# Patient Record
Sex: FEMALE | Race: BLACK OR AFRICAN AMERICAN | NOT HISPANIC OR LATINO | URBAN - NONMETROPOLITAN AREA
[De-identification: names, ages, dates, MRNs, and addresses within clinical notes are randomized per-mention and may not be internally consistent; named-entity substitution may affect disease eponyms.]

---

## 2023-03-02 LAB
ALANINE AMINOTRANSFERASE (SGPT) (U/L) IN SER/PLAS: 11 U/L (ref 7–45)
ALBUMIN (G/DL) IN SER/PLAS: 3.8 G/DL (ref 3.4–5)
ALKALINE PHOSPHATASE (U/L) IN SER/PLAS: 80 U/L (ref 33–110)
ANION GAP IN SER/PLAS: 12 MMOL/L (ref 10–20)
ASPARTATE AMINOTRANSFERASE (SGOT) (U/L) IN SER/PLAS: 15 U/L (ref 9–39)
BASOPHILS (10*3/UL) IN BLOOD BY AUTOMATED COUNT: 0.04 X10E9/L (ref 0–0.1)
BASOPHILS/100 LEUKOCYTES IN BLOOD BY AUTOMATED COUNT: 0.3 % (ref 0–2)
BILIRUBIN TOTAL (MG/DL) IN SER/PLAS: 0.3 MG/DL (ref 0–1.2)
CALCIUM (MG/DL) IN SER/PLAS: 8.9 MG/DL (ref 8.6–10.3)
CARBON DIOXIDE, TOTAL (MMOL/L) IN SER/PLAS: 22 MMOL/L (ref 21–32)
CHLORIDE (MMOL/L) IN SER/PLAS: 103 MMOL/L (ref 98–107)
CHOLESTEROL (MG/DL) IN SER/PLAS: 138 MG/DL (ref 0–199)
CHOLESTEROL IN HDL (MG/DL) IN SER/PLAS: 38 MG/DL
CHOLESTEROL/HDL RATIO: 3.6
CREATININE (MG/DL) IN SER/PLAS: 0.69 MG/DL (ref 0.5–1.05)
EOSINOPHILS (10*3/UL) IN BLOOD BY AUTOMATED COUNT: 0.55 X10E9/L (ref 0–0.7)
EOSINOPHILS/100 LEUKOCYTES IN BLOOD BY AUTOMATED COUNT: 4.4 % (ref 0–6)
ERYTHROCYTE DISTRIBUTION WIDTH (RATIO) BY AUTOMATED COUNT: 18.2 % (ref 11.5–14.5)
ERYTHROCYTE MEAN CORPUSCULAR HEMOGLOBIN CONCENTRATION (G/DL) BY AUTOMATED: 29.2 G/DL (ref 32–36)
ERYTHROCYTE MEAN CORPUSCULAR VOLUME (FL) BY AUTOMATED COUNT: 67 FL (ref 80–100)
ERYTHROCYTES (10*6/UL) IN BLOOD BY AUTOMATED COUNT: 5.39 X10E12/L (ref 4–5.2)
FERRITIN (UG/LL) IN SER/PLAS: 123 UG/L (ref 8–150)
FOLATE (NG/ML) IN SER/PLAS: 6.7 NG/ML
GFR FEMALE: >90 ML/MIN/1.73M2
GLUCOSE (MG/DL) IN SER/PLAS: 87 MG/DL (ref 74–99)
HEMATOCRIT (%) IN BLOOD BY AUTOMATED COUNT: 36 % (ref 36–46)
HEMOGLOBIN (G/DL) IN BLOOD: 10.5 G/DL (ref 12–16)
IMMATURE GRANULOCYTES/100 LEUKOCYTES IN BLOOD BY AUTOMATED COUNT: 0.6 % (ref 0–0.9)
IRON (UG/DL) IN SER/PLAS: 13 UG/DL (ref 35–150)
IRON BINDING CAPACITY (UG/DL) IN SER/PLAS: 288 UG/DL (ref 240–445)
IRON SATURATION (%) IN SER/PLAS: 5 % (ref 25–45)
LDL: 81 MG/DL (ref 0–99)
LEUKOCYTES (10*3/UL) IN BLOOD BY AUTOMATED COUNT: 12.4 X10E9/L (ref 4.4–11.3)
LYMPHOCYTES (10*3/UL) IN BLOOD BY AUTOMATED COUNT: 2.76 X10E9/L (ref 1.2–4.8)
LYMPHOCYTES/100 LEUKOCYTES IN BLOOD BY AUTOMATED COUNT: 22.2 % (ref 13–44)
MONOCYTES (10*3/UL) IN BLOOD BY AUTOMATED COUNT: 0.86 X10E9/L (ref 0.1–1)
MONOCYTES/100 LEUKOCYTES IN BLOOD BY AUTOMATED COUNT: 6.9 % (ref 2–10)
NEUTROPHILS (10*3/UL) IN BLOOD BY AUTOMATED COUNT: 8.15 X10E9/L (ref 1.2–7.7)
NEUTROPHILS/100 LEUKOCYTES IN BLOOD BY AUTOMATED COUNT: 65.6 % (ref 40–80)
PLATELETS (10*3/UL) IN BLOOD AUTOMATED COUNT: 478 X10E9/L (ref 150–450)
POTASSIUM (MMOL/L) IN SER/PLAS: 3.3 MMOL/L (ref 3.5–5.3)
PROTEIN TOTAL: 7.9 G/DL (ref 6.4–8.2)
SODIUM (MMOL/L) IN SER/PLAS: 134 MMOL/L (ref 136–145)
TRIGLYCERIDE (MG/DL) IN SER/PLAS: 95 MG/DL (ref 0–149)
UREA NITROGEN (MG/DL) IN SER/PLAS: 9 MG/DL (ref 6–23)
VLDL: 19 MG/DL (ref 0–40)

## 2023-03-03 LAB
ESTIMATED AVERAGE GLUCOSE FOR HBA1C: 146 MG/DL
HEMOGLOBIN A1C/HEMOGLOBIN TOTAL IN BLOOD: 6.7 %

## 2023-03-06 ENCOUNTER — TELEPHONE (OUTPATIENT)
Dept: PRIMARY CARE | Facility: CLINIC | Age: 53
End: 2023-03-06
Payer: MEDICARE

## 2023-03-14 RX ORDER — LOSARTAN POTASSIUM 50 MG/1
50 TABLET ORAL DAILY
COMMUNITY
End: 2023-07-27 | Stop reason: SDUPTHER

## 2023-03-14 RX ORDER — INSULIN DETEMIR 100 [IU]/ML
110 INJECTION, SOLUTION SUBCUTANEOUS NIGHTLY
COMMUNITY
End: 2023-07-27 | Stop reason: ALTCHOICE

## 2023-04-27 ENCOUNTER — TELEPHONE (OUTPATIENT)
Dept: PRIMARY CARE | Facility: CLINIC | Age: 53
End: 2023-04-27
Payer: MEDICARE

## 2023-04-27 DIAGNOSIS — E11.9 TYPE 2 DIABETES MELLITUS WITHOUT COMPLICATION, WITH LONG-TERM CURRENT USE OF INSULIN (MULTI): Primary | ICD-10-CM

## 2023-04-27 DIAGNOSIS — Z79.4 TYPE 2 DIABETES MELLITUS WITHOUT COMPLICATION, WITH LONG-TERM CURRENT USE OF INSULIN (MULTI): Primary | ICD-10-CM

## 2023-04-27 RX ORDER — TOPIRAMATE 50 MG/1
50 TABLET, FILM COATED ORAL DAILY
COMMUNITY
End: 2023-09-08 | Stop reason: ALTCHOICE

## 2023-04-27 RX ORDER — BLOOD-GLUCOSE,RECEIVER,CONT
EACH MISCELLANEOUS
Qty: 1 EACH | Refills: 0 | Status: SHIPPED | OUTPATIENT
Start: 2023-04-27 | End: 2023-07-27 | Stop reason: ALTCHOICE

## 2023-04-27 RX ORDER — ALPRAZOLAM 1 MG/1
1 TABLET ORAL NIGHTLY PRN
COMMUNITY
Start: 2022-01-21

## 2023-04-27 RX ORDER — BLOOD-GLUCOSE SENSOR
1 EACH MISCELLANEOUS DAILY
Qty: 1 EACH | Refills: 0 | Status: SHIPPED | OUTPATIENT
Start: 2023-04-27 | End: 2023-05-04 | Stop reason: SDUPTHER

## 2023-04-27 RX ORDER — FLUOXETINE HYDROCHLORIDE 40 MG/1
40 CAPSULE ORAL DAILY
COMMUNITY

## 2023-04-27 RX ORDER — FLASH GLUCOSE SENSOR
KIT MISCELLANEOUS
COMMUNITY
Start: 2023-04-17 | End: 2023-05-04 | Stop reason: SDUPTHER

## 2023-04-27 RX ORDER — LURASIDONE HYDROCHLORIDE 80 MG/1
1 TABLET, FILM COATED ORAL DAILY
COMMUNITY
End: 2024-03-25 | Stop reason: DRUGHIGH

## 2023-04-27 RX ORDER — FLASH GLUCOSE SCANNING READER
EACH MISCELLANEOUS
COMMUNITY
Start: 2023-02-12 | End: 2023-05-04 | Stop reason: SDUPTHER

## 2023-04-27 RX ORDER — ATORVASTATIN CALCIUM 20 MG/1
20 TABLET, FILM COATED ORAL DAILY
COMMUNITY
End: 2023-07-27 | Stop reason: SDUPTHER

## 2023-05-03 DIAGNOSIS — E11.9 TYPE 2 DIABETES MELLITUS WITHOUT COMPLICATION, WITH LONG-TERM CURRENT USE OF INSULIN (MULTI): ICD-10-CM

## 2023-05-03 DIAGNOSIS — Z79.4 TYPE 2 DIABETES MELLITUS WITHOUT COMPLICATION, WITH LONG-TERM CURRENT USE OF INSULIN (MULTI): ICD-10-CM

## 2023-05-03 RX ORDER — INSULIN DETEMIR 100 [IU]/ML
110 INJECTION, SOLUTION SUBCUTANEOUS NIGHTLY
COMMUNITY
Start: 2023-05-01 | End: 2023-05-03 | Stop reason: SDUPTHER

## 2023-05-03 RX ORDER — INSULIN DETEMIR 100 [IU]/ML
110 INJECTION, SOLUTION SUBCUTANEOUS NIGHTLY
Qty: 90 ML | Refills: 0 | Status: SHIPPED | OUTPATIENT
Start: 2023-05-03 | End: 2024-01-29 | Stop reason: SDUPTHER

## 2023-05-04 DIAGNOSIS — E11.9 TYPE 2 DIABETES MELLITUS WITHOUT COMPLICATION, WITH LONG-TERM CURRENT USE OF INSULIN (MULTI): ICD-10-CM

## 2023-05-04 DIAGNOSIS — E10.9 TYPE 1 DIABETES MELLITUS WITHOUT COMPLICATION (MULTI): ICD-10-CM

## 2023-05-04 DIAGNOSIS — Z79.4 TYPE 2 DIABETES MELLITUS WITHOUT COMPLICATION, WITH LONG-TERM CURRENT USE OF INSULIN (MULTI): ICD-10-CM

## 2023-05-04 RX ORDER — FLASH GLUCOSE SENSOR
KIT MISCELLANEOUS
Qty: 2 EACH | Refills: 0 | Status: SHIPPED | OUTPATIENT
Start: 2023-05-04 | End: 2023-05-25

## 2023-05-04 RX ORDER — BLOOD-GLUCOSE SENSOR
1 EACH MISCELLANEOUS DAILY
Qty: 10 EACH | Refills: 0 | Status: SHIPPED | OUTPATIENT
Start: 2023-05-04 | End: 2023-07-27 | Stop reason: ALTCHOICE

## 2023-05-04 RX ORDER — FLASH GLUCOSE SCANNING READER
EACH MISCELLANEOUS
Qty: 1 EACH | Refills: 0 | Status: SHIPPED | OUTPATIENT
Start: 2023-05-04 | End: 2023-07-05 | Stop reason: SDUPTHER

## 2023-05-23 DIAGNOSIS — E11.9 TYPE 2 DIABETES MELLITUS WITHOUT COMPLICATION, WITH LONG-TERM CURRENT USE OF INSULIN (MULTI): ICD-10-CM

## 2023-05-23 DIAGNOSIS — Z79.4 TYPE 2 DIABETES MELLITUS WITHOUT COMPLICATION, WITH LONG-TERM CURRENT USE OF INSULIN (MULTI): ICD-10-CM

## 2023-05-23 RX ORDER — PEN NEEDLE, DIABETIC 30 GX3/16"
200 NEEDLE, DISPOSABLE MISCELLANEOUS 2 TIMES DAILY
Qty: 200 EACH | Refills: 0 | Status: SHIPPED | OUTPATIENT
Start: 2023-05-23 | End: 2023-09-08 | Stop reason: SDUPTHER

## 2023-05-23 RX ORDER — PEN NEEDLE, DIABETIC 30 GX3/16"
200 NEEDLE, DISPOSABLE MISCELLANEOUS 2 TIMES DAILY
COMMUNITY
End: 2023-05-23 | Stop reason: SDUPTHER

## 2023-05-24 DIAGNOSIS — E10.9 TYPE 1 DIABETES MELLITUS WITHOUT COMPLICATION (MULTI): ICD-10-CM

## 2023-05-25 DIAGNOSIS — E10.9 TYPE 1 DIABETES MELLITUS WITHOUT COMPLICATION (MULTI): ICD-10-CM

## 2023-05-25 RX ORDER — FLASH GLUCOSE SENSOR
KIT MISCELLANEOUS
Qty: 2 EACH | Refills: 0 | Status: SHIPPED | OUTPATIENT
Start: 2023-05-25 | End: 2023-07-05 | Stop reason: SDUPTHER

## 2023-05-25 RX ORDER — FLASH GLUCOSE SENSOR
KIT MISCELLANEOUS
Qty: 2 EACH | Refills: 0 | Status: SHIPPED | OUTPATIENT
Start: 2023-05-25 | End: 2023-05-25 | Stop reason: SDUPTHER

## 2023-06-28 DIAGNOSIS — R11.0 NAUSEA: ICD-10-CM

## 2023-06-28 RX ORDER — ONDANSETRON 4 MG/1
4 TABLET, FILM COATED ORAL
Qty: 20 TABLET | Refills: 0 | Status: SHIPPED | OUTPATIENT
Start: 2023-06-28 | End: 2023-07-27 | Stop reason: SDUPTHER

## 2023-06-28 RX ORDER — ONDANSETRON 4 MG/1
1 TABLET, FILM COATED ORAL
COMMUNITY
Start: 2023-01-17 | End: 2023-06-28 | Stop reason: SDUPTHER

## 2023-07-05 DIAGNOSIS — E10.9 TYPE 1 DIABETES MELLITUS WITHOUT COMPLICATION (MULTI): ICD-10-CM

## 2023-07-05 RX ORDER — FLASH GLUCOSE SENSOR
KIT MISCELLANEOUS
Qty: 2 EACH | Refills: 0 | Status: SHIPPED | OUTPATIENT
Start: 2023-07-05 | End: 2023-10-06

## 2023-07-05 RX ORDER — FLASH GLUCOSE SCANNING READER
EACH MISCELLANEOUS
Qty: 1 EACH | Refills: 0 | Status: SHIPPED | OUTPATIENT
Start: 2023-07-05

## 2023-07-24 NOTE — PROGRESS NOTES
Subjective   Patient ID:   Km Tam is a 52 y.o. female who presents for Fatigue (Iron - anemia) and Diabetes.  HPI  Fatigue:  Symptoms x months.  Sleeping a lot.  No recent fatigue labs.  She does smoke marijuana, but states she has cut down.    Chronic back pain:  Was referred to pain management by Dr. Lopez in Aug 2022.  Uses medical marijuana.    Stress incontinence/Hidradenitis suppurativa:  Seeing urology/GYN.  Was put on Doxycycline and Clindamycin cream.    Anemia/Iron deficiency:  CBC in Feb 2022 showed a mild anemia.  Iron was found to be low in June 2022.  We started an iron supplement - has been hit and miss with this.  This has improved.    HTN:  Taking Losartan.  BP today is 137/88.  Denies dizziness, headaches.    HLD:  Taking Atorvastatin.  Last checked March 2023.    Diabetes:  Taking Levemir 55 units AM and 55 units PM.  Had previously been on 110 units in PM.  Last A1C was 6.7 in March 2023.  AM fasting sugars averaging low 100s.  Sugars seem to fluctuate throughout the day.  DUE for labs.    Schizophrenia/PTSD:  Taking Xanax, Prozac, Latuda.  Seeing psychiatry.  Denies SI/HI.    Health maintenance:  Smoking: Marijuana daily  Mammogram (40-75): March 2023.  Labs: March 2023. DUE for A1C, fatigue labs.  Colonoscopy (50-75): DUE - referred last visit.  Influenza:    Review of Systems  12 point review of systems negative unless stated above in HPI    Vitals:    07/27/23 0843   BP: 137/88   Pulse: 89   Resp: 18   SpO2: 95%     Physical Exam  General: Alert and oriented, well nourished, no acute distress.  Lungs: Clear to auscultation, non-labored respiration.  Heart: Normal rate, regular rhythm, no murmur, gallop or edema.  Neurologic: Awake, alert, and oriented X3, CN II-XII intact.  Psychiatric: Cooperative, appropriate mood and affect.    Assessment/Plan   I have ordered some labs to be done as soon as you can.  We will call you with the results.  These will check for causes of  fatigue.  Consider a sleep study.  I have placed a referral to a nutritionist.  If symptoms persist or worsen despite current plan of care, please contact your healthcare provider for further evaluation.  Patient instructed to contact the office if there are any questions regarding their care or treatment.   [facility] [phone].  [facility] [phone].    Fu 3-4 months  Diagnoses and all orders for this visit:  Fatigue, unspecified type  -     CBC and Auto Differential; Future  -     Comprehensive Metabolic Panel; Future  -     Vitamin D, Total; Future  -     Vitamin B12; Future  -     TSH with reflex to Free T4 if abnormal; Future  -     Rheumatoid Factor; Future  -     Magnesium; Future  -     Iron and TIBC; Future  -     Sedimentation Rate; Future  -     Folate; Future  -     Ferritin; Future  -     Hemoglobin A1C; Future  -     C-Reactive Protein; Future  -     Citrulline Antibody, IgG; Future  -     BANDAR with Reflex to EMILY; Future  Nausea  -     ondansetron (Zofran) 4 mg tablet; Take 1 tablet (4 mg) by mouth every 6 hours during the day.  Benign hypertension  -     losartan (Cozaar) 50 mg tablet; Take 1 tablet (50 mg) by mouth once daily.  Nausea and vomiting, unspecified vomiting type  Hypercholesterolemia  -     atorvastatin (Lipitor) 20 mg tablet; Take 1 tablet (20 mg) by mouth once daily.  Schizophrenia, unspecified type (CMS/[facility])  Obesity, morbid (CMS/[facility])  Controlled type 2 diabetes mellitus without complication, without long-term current use of insulin (CMS/[facility])  -     Hemoglobin A1C; Future  -     Referral to Nutrition Services; Future  Iron deficiency anemia, unspecified iron deficiency anemia type  -     Iron and TIBC; Future  -     Ferritin; Future

## 2023-07-27 ENCOUNTER — LAB (OUTPATIENT)
Dept: LAB | Facility: LAB | Age: 53
End: 2023-07-27
Payer: MEDICARE

## 2023-07-27 ENCOUNTER — OFFICE VISIT (OUTPATIENT)
Dept: PRIMARY CARE | Facility: CLINIC | Age: 53
End: 2023-07-27
Payer: MEDICARE

## 2023-07-27 VITALS
WEIGHT: 234.8 LBS | OXYGEN SATURATION: 95 % | DIASTOLIC BLOOD PRESSURE: 88 MMHG | SYSTOLIC BLOOD PRESSURE: 137 MMHG | HEIGHT: 60 IN | HEART RATE: 89 BPM | RESPIRATION RATE: 18 BRPM | BODY MASS INDEX: 46.1 KG/M2

## 2023-07-27 DIAGNOSIS — R11.2 NAUSEA AND VOMITING, UNSPECIFIED VOMITING TYPE: ICD-10-CM

## 2023-07-27 DIAGNOSIS — D50.9 IRON DEFICIENCY ANEMIA, UNSPECIFIED IRON DEFICIENCY ANEMIA TYPE: ICD-10-CM

## 2023-07-27 DIAGNOSIS — E78.00 HYPERCHOLESTEROLEMIA: ICD-10-CM

## 2023-07-27 DIAGNOSIS — F20.9 SCHIZOPHRENIA, UNSPECIFIED TYPE (MULTI): ICD-10-CM

## 2023-07-27 DIAGNOSIS — R11.0 NAUSEA: ICD-10-CM

## 2023-07-27 DIAGNOSIS — E11.9 CONTROLLED TYPE 2 DIABETES MELLITUS WITHOUT COMPLICATION, WITHOUT LONG-TERM CURRENT USE OF INSULIN (MULTI): ICD-10-CM

## 2023-07-27 DIAGNOSIS — I10 BENIGN HYPERTENSION: ICD-10-CM

## 2023-07-27 DIAGNOSIS — E66.01 OBESITY, MORBID (MULTI): ICD-10-CM

## 2023-07-27 DIAGNOSIS — R53.83 FATIGUE, UNSPECIFIED TYPE: Primary | ICD-10-CM

## 2023-07-27 DIAGNOSIS — R53.83 FATIGUE, UNSPECIFIED TYPE: ICD-10-CM

## 2023-07-27 PROBLEM — M25.561 RIGHT KNEE PAIN: Status: ACTIVE | Noted: 2023-07-27

## 2023-07-27 PROBLEM — D64.9 ANEMIA: Status: ACTIVE | Noted: 2023-07-27

## 2023-07-27 PROBLEM — M54.50 LUMBAR PAIN: Status: ACTIVE | Noted: 2023-07-27

## 2023-07-27 PROBLEM — N39.0 ACUTE LOWER UTI: Status: RESOLVED | Noted: 2023-07-27 | Resolved: 2023-07-27

## 2023-07-27 PROBLEM — E55.9 VITAMIN D DEFICIENCY: Status: ACTIVE | Noted: 2023-07-27

## 2023-07-27 PROBLEM — N39.46 MIXED STRESS AND URGE URINARY INCONTINENCE: Status: ACTIVE | Noted: 2023-07-27

## 2023-07-27 PROBLEM — L73.2 HIDRADENITIS SUPPURATIVA: Status: ACTIVE | Noted: 2023-07-27

## 2023-07-27 PROBLEM — N32.81 OAB (OVERACTIVE BLADDER): Status: ACTIVE | Noted: 2023-07-27

## 2023-07-27 PROBLEM — G47.00 INSOMNIA: Status: ACTIVE | Noted: 2023-07-27

## 2023-07-27 PROBLEM — F43.10 PTSD (POST-TRAUMATIC STRESS DISORDER): Status: ACTIVE | Noted: 2023-07-27

## 2023-07-27 LAB
ALANINE AMINOTRANSFERASE (SGPT) (U/L) IN SER/PLAS: 13 U/L (ref 7–45)
ALBUMIN (G/DL) IN SER/PLAS: 4 G/DL (ref 3.4–5)
ALKALINE PHOSPHATASE (U/L) IN SER/PLAS: 85 U/L (ref 33–110)
ANION GAP IN SER/PLAS: 13 MMOL/L (ref 10–20)
ASPARTATE AMINOTRANSFERASE (SGOT) (U/L) IN SER/PLAS: 13 U/L (ref 9–39)
BASOPHILS (10*3/UL) IN BLOOD BY AUTOMATED COUNT: 0.03 X10E9/L (ref 0–0.1)
BASOPHILS/100 LEUKOCYTES IN BLOOD BY AUTOMATED COUNT: 0.4 % (ref 0–2)
BILIRUBIN TOTAL (MG/DL) IN SER/PLAS: 0.3 MG/DL (ref 0–1.2)
C REACTIVE PROTEIN (MG/L) IN SER/PLAS: 2.16 MG/DL
CALCIDIOL (25 OH VITAMIN D3) (NG/ML) IN SER/PLAS: 29 NG/ML
CALCIUM (MG/DL) IN SER/PLAS: 9.5 MG/DL (ref 8.6–10.3)
CARBON DIOXIDE, TOTAL (MMOL/L) IN SER/PLAS: 23 MMOL/L (ref 21–32)
CHLORIDE (MMOL/L) IN SER/PLAS: 107 MMOL/L (ref 98–107)
CITRULLINE ANTIBODY, IGG: <1 U/ML
COBALAMIN (VITAMIN B12) (PG/ML) IN SER/PLAS: 498 PG/ML (ref 211–911)
CREATININE (MG/DL) IN SER/PLAS: 0.63 MG/DL (ref 0.5–1.05)
EOSINOPHILS (10*3/UL) IN BLOOD BY AUTOMATED COUNT: 0.23 X10E9/L (ref 0–0.7)
EOSINOPHILS/100 LEUKOCYTES IN BLOOD BY AUTOMATED COUNT: 2.7 % (ref 0–6)
ERYTHROCYTE DISTRIBUTION WIDTH (RATIO) BY AUTOMATED COUNT: 16.8 % (ref 11.5–14.5)
ERYTHROCYTE MEAN CORPUSCULAR HEMOGLOBIN CONCENTRATION (G/DL) BY AUTOMATED: 29.1 G/DL (ref 32–36)
ERYTHROCYTE MEAN CORPUSCULAR VOLUME (FL) BY AUTOMATED COUNT: 69 FL (ref 80–100)
ERYTHROCYTES (10*6/UL) IN BLOOD BY AUTOMATED COUNT: 5.59 X10E12/L (ref 4–5.2)
ESTIMATED AVERAGE GLUCOSE FOR HBA1C: 166 MG/DL
FERRITIN (UG/LL) IN SER/PLAS: 74 UG/L (ref 8–150)
FOLATE (NG/ML) IN SER/PLAS: 11.2 NG/ML
GFR FEMALE: >90 ML/MIN/1.73M2
GLUCOSE (MG/DL) IN SER/PLAS: 134 MG/DL (ref 74–99)
HEMATOCRIT (%) IN BLOOD BY AUTOMATED COUNT: 38.5 % (ref 36–46)
HEMOGLOBIN (G/DL) IN BLOOD: 11.2 G/DL (ref 12–16)
HEMOGLOBIN A1C/HEMOGLOBIN TOTAL IN BLOOD: 7.4 %
IMMATURE GRANULOCYTES/100 LEUKOCYTES IN BLOOD BY AUTOMATED COUNT: 0.5 % (ref 0–0.9)
IRON (UG/DL) IN SER/PLAS: 38 UG/DL (ref 35–150)
IRON BINDING CAPACITY (UG/DL) IN SER/PLAS: 342 UG/DL (ref 240–445)
IRON SATURATION (%) IN SER/PLAS: 11 % (ref 25–45)
LEUKOCYTES (10*3/UL) IN BLOOD BY AUTOMATED COUNT: 8.5 X10E9/L (ref 4.4–11.3)
LYMPHOCYTES (10*3/UL) IN BLOOD BY AUTOMATED COUNT: 2.86 X10E9/L (ref 1.2–4.8)
LYMPHOCYTES/100 LEUKOCYTES IN BLOOD BY AUTOMATED COUNT: 33.7 % (ref 13–44)
MAGNESIUM (MG/DL) IN SER/PLAS: 1.86 MG/DL (ref 1.6–2.4)
MONOCYTES (10*3/UL) IN BLOOD BY AUTOMATED COUNT: 0.55 X10E9/L (ref 0.1–1)
MONOCYTES/100 LEUKOCYTES IN BLOOD BY AUTOMATED COUNT: 6.5 % (ref 2–10)
NEUTROPHILS (10*3/UL) IN BLOOD BY AUTOMATED COUNT: 4.77 X10E9/L (ref 1.2–7.7)
NEUTROPHILS/100 LEUKOCYTES IN BLOOD BY AUTOMATED COUNT: 56.2 % (ref 40–80)
PLATELETS (10*3/UL) IN BLOOD AUTOMATED COUNT: 445 X10E9/L (ref 150–450)
POTASSIUM (MMOL/L) IN SER/PLAS: 3.7 MMOL/L (ref 3.5–5.3)
PROTEIN TOTAL: 7.7 G/DL (ref 6.4–8.2)
RHEUMATOID FACTOR (IU/ML) IN SERUM OR PLASMA: <10 IU/ML (ref 0–15)
SEDIMENTATION RATE, ERYTHROCYTE: 73 MM/H (ref 0–30)
SODIUM (MMOL/L) IN SER/PLAS: 139 MMOL/L (ref 136–145)
THYROTROPIN (MIU/L) IN SER/PLAS BY DETECTION LIMIT <= 0.05 MIU/L: 1.52 MIU/L (ref 0.44–3.98)
UREA NITROGEN (MG/DL) IN SER/PLAS: 10 MG/DL (ref 6–23)

## 2023-07-27 PROCEDURE — 84443 ASSAY THYROID STIM HORMONE: CPT

## 2023-07-27 PROCEDURE — 82746 ASSAY OF FOLIC ACID SERUM: CPT

## 2023-07-27 PROCEDURE — 85025 COMPLETE CBC W/AUTO DIFF WBC: CPT

## 2023-07-27 PROCEDURE — 82306 VITAMIN D 25 HYDROXY: CPT

## 2023-07-27 PROCEDURE — 83550 IRON BINDING TEST: CPT

## 2023-07-27 PROCEDURE — 83036 HEMOGLOBIN GLYCOSYLATED A1C: CPT

## 2023-07-27 PROCEDURE — 80053 COMPREHEN METABOLIC PANEL: CPT

## 2023-07-27 PROCEDURE — 86038 ANTINUCLEAR ANTIBODIES: CPT

## 2023-07-27 PROCEDURE — 36415 COLL VENOUS BLD VENIPUNCTURE: CPT

## 2023-07-27 PROCEDURE — 99213 OFFICE O/P EST LOW 20 MIN: CPT | Performed by: PHYSICIAN ASSISTANT

## 2023-07-27 PROCEDURE — 3075F SYST BP GE 130 - 139MM HG: CPT | Performed by: PHYSICIAN ASSISTANT

## 2023-07-27 PROCEDURE — 82607 VITAMIN B-12: CPT

## 2023-07-27 PROCEDURE — 83735 ASSAY OF MAGNESIUM: CPT

## 2023-07-27 PROCEDURE — 82728 ASSAY OF FERRITIN: CPT

## 2023-07-27 PROCEDURE — 3044F HG A1C LEVEL LT 7.0%: CPT | Performed by: PHYSICIAN ASSISTANT

## 2023-07-27 PROCEDURE — 1036F TOBACCO NON-USER: CPT | Performed by: PHYSICIAN ASSISTANT

## 2023-07-27 PROCEDURE — 86200 CCP ANTIBODY: CPT

## 2023-07-27 PROCEDURE — 3079F DIAST BP 80-89 MM HG: CPT | Performed by: PHYSICIAN ASSISTANT

## 2023-07-27 PROCEDURE — 86431 RHEUMATOID FACTOR QUANT: CPT

## 2023-07-27 PROCEDURE — 86140 C-REACTIVE PROTEIN: CPT

## 2023-07-27 PROCEDURE — 4010F ACE/ARB THERAPY RXD/TAKEN: CPT | Performed by: PHYSICIAN ASSISTANT

## 2023-07-27 PROCEDURE — 83540 ASSAY OF IRON: CPT

## 2023-07-27 PROCEDURE — 85652 RBC SED RATE AUTOMATED: CPT

## 2023-07-27 RX ORDER — ONDANSETRON 4 MG/1
4 TABLET, FILM COATED ORAL
Qty: 20 TABLET | Refills: 0 | Status: SHIPPED | OUTPATIENT
Start: 2023-07-27

## 2023-07-27 RX ORDER — ATORVASTATIN CALCIUM 20 MG/1
20 TABLET, FILM COATED ORAL DAILY
Qty: 90 TABLET | Refills: 1 | Status: SHIPPED | OUTPATIENT
Start: 2023-07-27 | End: 2024-01-29 | Stop reason: SDUPTHER

## 2023-07-27 RX ORDER — LOSARTAN POTASSIUM 50 MG/1
50 TABLET ORAL DAILY
Qty: 90 TABLET | Refills: 1 | Status: SHIPPED | OUTPATIENT
Start: 2023-07-27 | End: 2024-01-29 | Stop reason: SDUPTHER

## 2023-07-27 ASSESSMENT — PAIN SCALES - GENERAL: PAINLEVEL: 0-NO PAIN

## 2023-07-28 LAB — ANTI-NUCLEAR ANTIBODY (ANA): NEGATIVE

## 2023-07-30 NOTE — RESULT ENCOUNTER NOTE
Negative BANDAR. She is having inflammation since her inflammatory markers are elevated, but autoimmune tests are normal. We can try a trial of Prednisone if she is agreeable

## 2023-07-31 DIAGNOSIS — R70.0 ELEVATED ERYTHROCYTE SEDIMENTATION RATE: Primary | ICD-10-CM

## 2023-07-31 RX ORDER — PREDNISONE 20 MG/1
TABLET ORAL
Qty: 21 TABLET | Refills: 0 | Status: SHIPPED | OUTPATIENT
Start: 2023-07-31 | End: 2023-11-14 | Stop reason: ALTCHOICE

## 2023-09-08 DIAGNOSIS — E11.9 TYPE 2 DIABETES MELLITUS WITHOUT COMPLICATION, WITH LONG-TERM CURRENT USE OF INSULIN (MULTI): ICD-10-CM

## 2023-09-08 DIAGNOSIS — Z79.4 TYPE 2 DIABETES MELLITUS WITHOUT COMPLICATION, WITH LONG-TERM CURRENT USE OF INSULIN (MULTI): ICD-10-CM

## 2023-09-08 DIAGNOSIS — E11.9 CONTROLLED TYPE 2 DIABETES MELLITUS WITHOUT COMPLICATION, WITHOUT LONG-TERM CURRENT USE OF INSULIN (MULTI): Primary | ICD-10-CM

## 2023-09-08 RX ORDER — PEN NEEDLE, DIABETIC 30 GX3/16"
200 NEEDLE, DISPOSABLE MISCELLANEOUS 2 TIMES DAILY
Qty: 200 EACH | Refills: 0 | Status: SHIPPED | OUTPATIENT
Start: 2023-09-08 | End: 2024-02-05

## 2023-09-11 PROBLEM — E10.9 TYPE 1 DIABETES MELLITUS WITHOUT COMPLICATION (MULTI): Status: ACTIVE | Noted: 2023-09-11

## 2023-10-06 DIAGNOSIS — E11.9 CONTROLLED TYPE 2 DIABETES MELLITUS WITHOUT COMPLICATION, WITHOUT LONG-TERM CURRENT USE OF INSULIN (MULTI): ICD-10-CM

## 2023-10-06 DIAGNOSIS — E10.9 TYPE 1 DIABETES MELLITUS WITHOUT COMPLICATION (MULTI): ICD-10-CM

## 2023-10-06 RX ORDER — SEMAGLUTIDE 0.68 MG/ML
0.25 INJECTION, SOLUTION SUBCUTANEOUS
Qty: 3 ML | Refills: 0 | Status: SHIPPED | OUTPATIENT
Start: 2023-10-06 | End: 2023-11-14 | Stop reason: SINTOL

## 2023-10-06 RX ORDER — FLASH GLUCOSE SENSOR
KIT MISCELLANEOUS
Qty: 2 EACH | Refills: 0 | Status: SHIPPED | OUTPATIENT
Start: 2023-10-06 | End: 2023-11-02

## 2023-10-23 PROBLEM — K62.5 RECTAL HEMORRHAGE: Status: ACTIVE | Noted: 2023-10-23

## 2023-10-23 PROBLEM — S02.5XXA FRACTURE OF TOOTH: Status: ACTIVE | Noted: 2023-10-23

## 2023-10-23 PROBLEM — M25.559 PAIN IN JOINT INVOLVING PELVIC REGION AND THIGH: Status: ACTIVE | Noted: 2023-10-23

## 2023-10-23 PROBLEM — M19.90 OSTEOARTHRITIS: Status: ACTIVE | Noted: 2023-10-23

## 2023-10-23 PROBLEM — E11.10 DIABETIC KETOACIDOSIS (MULTI): Status: ACTIVE | Noted: 2023-10-23

## 2023-10-23 PROBLEM — S40.029A CONTUSION OF UPPER LIMB: Status: ACTIVE | Noted: 2023-10-23

## 2023-10-23 PROBLEM — N61.0 CELLULITIS OF BREAST: Status: ACTIVE | Noted: 2023-10-23

## 2023-10-23 PROBLEM — H10.30 ACUTE CONJUNCTIVITIS: Status: ACTIVE | Noted: 2023-10-23

## 2023-10-23 PROBLEM — R32 URINARY INCONTINENCE INAPPROPRIATE FOR AGE: Status: ACTIVE | Noted: 2023-10-23

## 2023-10-23 PROBLEM — S69.90XA INJURY OF FINGER: Status: ACTIVE | Noted: 2023-10-23

## 2023-10-23 PROBLEM — N61.1 ABSCESS OF BREAST: Status: ACTIVE | Noted: 2023-10-23

## 2023-10-23 PROBLEM — R21 RASH: Status: ACTIVE | Noted: 2023-10-23

## 2023-10-23 PROBLEM — M17.9 OSTEOARTHRITIS OF KNEE: Status: ACTIVE | Noted: 2023-10-23

## 2023-10-23 PROBLEM — S83.92XA SPRAIN OF LEFT KNEE: Status: ACTIVE | Noted: 2023-10-23

## 2023-10-23 PROBLEM — E63.8 NUTRITIONAL INTAKE LESS THAN BODY REQUIREMENTS: Status: ACTIVE | Noted: 2023-10-23

## 2023-10-23 RX ORDER — INSULIN LISPRO 100 [IU]/ML
INJECTION, SOLUTION INTRAVENOUS; SUBCUTANEOUS
COMMUNITY
Start: 2017-10-03 | End: 2023-11-14 | Stop reason: ALTCHOICE

## 2023-10-23 RX ORDER — ACETAMINOPHEN AND CODEINE PHOSPHATE 300; 30 MG/1; MG/1
TABLET ORAL
COMMUNITY
Start: 2022-11-08 | End: 2023-11-14 | Stop reason: ALTCHOICE

## 2023-10-23 RX ORDER — SULFAMETHOXAZOLE AND TRIMETHOPRIM 800; 160 MG/1; MG/1
TABLET ORAL
COMMUNITY
End: 2023-11-14 | Stop reason: ALTCHOICE

## 2023-10-25 ENCOUNTER — TELEMEDICINE CLINICAL SUPPORT (OUTPATIENT)
Dept: NUTRITION | Facility: CLINIC | Age: 53
End: 2023-10-25
Payer: MEDICARE

## 2023-10-25 DIAGNOSIS — E11.9 CONTROLLED TYPE 2 DIABETES MELLITUS WITHOUT COMPLICATION, WITHOUT LONG-TERM CURRENT USE OF INSULIN (MULTI): Primary | ICD-10-CM

## 2023-10-25 PROCEDURE — 97802 MEDICAL NUTRITION INDIV IN: CPT | Performed by: DIETITIAN, REGISTERED

## 2023-10-25 NOTE — PATIENT INSTRUCTIONS
"Aim for the my plate model of eating; make 1/2 the plate non-starchy vegetables, 1/4 the plate lean protein, and 1/4 the plate starch    Choose healthy proteins like skinless poultry or fish.  Plant based proteins include, nuts, nut butters, seeds, beans, lentils, soy products like tofu and tempeh.    Choose whole grains like whole wheat pasta, brown rice, whole wheat bread, whole grain bread, quinoa, etc.  Look for the word \"whole\" on the label    Limit starch to no more than 1 cup (or 1 fist) per meal    Try including frozen vegetables (like green beans, broccoli, cauliflower, carrots, and spinach) for a convenient way to increase your intake    Aim for 25+ g of fiber daily.  Increase fiber gradually, over time.  As you increase your fiber intake, you may need to increase your water intake as well    Aim to eat within 1-2 hours of waking, and then again every 3-4 hours after for 3 meals and 1-2 snacks per day.    Include a protein at every meal!  For breakfast, try whole grain or whole wheat toast with nut butter and sprinkle mazin or ground flax seeds on top.  Can also have eggs (try making hard boiled eggs in advance for convenience).  Limit high sodium, high saturated fat breakfast meats (like ventura and sausage).      Look at free, online videos for chair yoga  "

## 2023-10-25 NOTE — PROGRESS NOTES
Assessment:  Pt is a 52 y.o. yr old Female referred for initial nutrition assessment.  She consents to virtual appt today.  Desires weight loss and improved mobility.   Blood sugar has been well controlled.  She takes levimir at night (no meal-time insulin) and has a continuous glucose monitor.  Diet history reflects inadequate fiber intake and long periods of fasting.  Discussed general healthy eating guidelines following the my plate model to promote slowed disease progression, weight mgmt, and wellness.  Reviewed macronutrient food groups and effects on blood glucose.  Pt receptive, asks appropriate questions, demonstrates understanding.      Past Medical Hx:  Patient Active Problem List   Diagnosis    Abnormality of gait    Anemia    Benign hypertension    Diabetes mellitus type 2, controlled (CMS/HCC)    Mixed stress and urge urinary incontinence    Hidradenitis suppurativa    Hypercholesterolemia    Insomnia    Iron deficiency anemia    Lumbar pain    OAB (overactive bladder)    Obesity, morbid (CMS/HCC)    Pain in joint, lower leg    PTSD (post-traumatic stress disorder)    Right knee pain    Schizophrenia (CMS/HCC)    Vitamin D deficiency    Type 1 diabetes mellitus without complication (CMS/HCC)    Cellulitis of breast    Abscess of breast    Acute conjunctivitis    Contusion of upper limb    Diabetic ketoacidosis (CMS/HCC)    Fracture of tooth    Osteoarthritis    Osteoarthritis of knee    Nutritional intake less than body requirements    Injury of finger    Pain in joint involving pelvic region and thigh    Sprain of left knee    Rectal hemorrhage    Rash    Urinary incontinence inappropriate for age        Lab Results   Component Value Date    HGBA1C 7.4 (A) 07/27/2023    CHOL 138 03/02/2023    LDLF 81 03/02/2023    TRIG 95 03/02/2023    HDL 38.0 (A) 03/02/2023      Typical Intake:  Breakfast: skips  Lunch: 1-2p chicken, rice/noodles  Dinner: chicken, rice/noodles  Snack: in the middle of the night,  popsicle, ice cream  Beverages:  Flavored water and sugar-free drinks.  Stopped drinking pop 3 weeks ago. No alcohol    Meal preparation: pt and her daughter cook  Dining out/take out/fast food: 2-3x/week    Allergies: None  Intolerance: None  Appetite: Normal  GI Symptoms : constipation Frequency: frequent  Swallowing Difficulty: No problems with swallowing  Dentition : no upper teeth, prefers soft texture .  Planning to have bottom teeth removed and new dentures    Vitamins/minerals/supplements: none    Exercise: sedentary, limited to leg pain    Estimated needs:  1427 kcal/d         [place] Jeor x 1.2 - 500 kcal  86 g pro/d          0.8 g/kg      Diagnosis:   Diagnosis Statement 1:  Diagnosis Status: New  Diagnosis : Obese related to  excessive energy intake  as evidenced by BMI of 46.    Diagnosis Statement 2:  Diagnosis Status: New  Diagnosis : Altered nutrition related lab values  related to  endocrine dysfunction  as evidenced by  Hgb A1c of 7.4%      Intervention:  MNT for diabetes HTN, HLD, and class 3 obesity  General healthy eating guidelines- My Plate Model  Consistent Carb, Consistent Protein Intake (will discuss carb counting at next visit)      Monitoring and Evaluation:  Will monitor weight trend, intake, labs, and pt progress.    Nubia Hale MS, RDN, LD

## 2023-10-26 ENCOUNTER — APPOINTMENT (OUTPATIENT)
Dept: PRIMARY CARE | Facility: CLINIC | Age: 53
End: 2023-10-26
Payer: MEDICARE

## 2023-11-02 DIAGNOSIS — E10.9 TYPE 1 DIABETES MELLITUS WITHOUT COMPLICATION (MULTI): ICD-10-CM

## 2023-11-02 RX ORDER — FLASH GLUCOSE SENSOR
KIT MISCELLANEOUS
Qty: 2 EACH | Refills: 0 | Status: SHIPPED | OUTPATIENT
Start: 2023-11-02 | End: 2023-11-29

## 2023-11-14 ENCOUNTER — OFFICE VISIT (OUTPATIENT)
Dept: PRIMARY CARE | Facility: CLINIC | Age: 53
End: 2023-11-14
Payer: MEDICARE

## 2023-11-14 VITALS
WEIGHT: 226 LBS | HEART RATE: 92 BPM | BODY MASS INDEX: 44.37 KG/M2 | DIASTOLIC BLOOD PRESSURE: 92 MMHG | OXYGEN SATURATION: 95 % | SYSTOLIC BLOOD PRESSURE: 130 MMHG | RESPIRATION RATE: 18 BRPM | HEIGHT: 60 IN

## 2023-11-14 DIAGNOSIS — M25.561 CHRONIC PAIN OF RIGHT KNEE: ICD-10-CM

## 2023-11-14 DIAGNOSIS — G89.29 CHRONIC PAIN OF RIGHT KNEE: ICD-10-CM

## 2023-11-14 DIAGNOSIS — K21.9 GASTROESOPHAGEAL REFLUX DISEASE, UNSPECIFIED WHETHER ESOPHAGITIS PRESENT: Primary | ICD-10-CM

## 2023-11-14 DIAGNOSIS — E10.9 TYPE 1 DIABETES MELLITUS WITHOUT COMPLICATION (MULTI): ICD-10-CM

## 2023-11-14 LAB — POC HEMOGLOBIN A1C: 7.6 % (ref 4.2–6.5)

## 2023-11-14 PROCEDURE — 3051F HG A1C>EQUAL 7.0%<8.0%: CPT | Performed by: INTERNAL MEDICINE

## 2023-11-14 PROCEDURE — 83036 HEMOGLOBIN GLYCOSYLATED A1C: CPT | Performed by: INTERNAL MEDICINE

## 2023-11-14 PROCEDURE — 3075F SYST BP GE 130 - 139MM HG: CPT | Performed by: INTERNAL MEDICINE

## 2023-11-14 PROCEDURE — 4010F ACE/ARB THERAPY RXD/TAKEN: CPT | Performed by: INTERNAL MEDICINE

## 2023-11-14 PROCEDURE — 99214 OFFICE O/P EST MOD 30 MIN: CPT | Performed by: INTERNAL MEDICINE

## 2023-11-14 PROCEDURE — 1036F TOBACCO NON-USER: CPT | Performed by: INTERNAL MEDICINE

## 2023-11-14 PROCEDURE — 3080F DIAST BP >= 90 MM HG: CPT | Performed by: INTERNAL MEDICINE

## 2023-11-14 RX ORDER — CELECOXIB 200 MG/1
200 CAPSULE ORAL DAILY PRN
Qty: 15 CAPSULE | Refills: 0 | Status: SHIPPED | OUTPATIENT
Start: 2023-11-14 | End: 2023-12-11

## 2023-11-14 RX ORDER — DICLOFENAC SODIUM 10 MG/G
4 GEL TOPICAL 4 TIMES DAILY
Qty: 100 G | Refills: 1 | Status: SHIPPED | OUTPATIENT
Start: 2023-11-14

## 2023-11-14 RX ORDER — OMEPRAZOLE 20 MG/1
40 CAPSULE, DELAYED RELEASE ORAL DAILY
Qty: 60 CAPSULE | Refills: 1 | Status: SHIPPED | OUTPATIENT
Start: 2023-11-14 | End: 2024-01-08

## 2023-11-14 ASSESSMENT — PAIN SCALES - GENERAL: PAINLEVEL: 7

## 2023-11-14 NOTE — PROGRESS NOTES
Patient ID:   Km Tam is a 52 y.o. female with PMH remarkable for chronic back pain, HTN, HLD, DM2, schizophrenia, PTSD who presents to the office today for Knee Pain (Right ) and GERD (Would like to try omeprazole ).    Acute Issue: right knee pain  - reports that she has had 2 knee surgeries in the past.  - reports that she has had imaging on right knee in the past  - requesting medication for pain for the right hip and right knee pain  - declines any steroid treatment  - start on celebrex 200mg every day x7d  - ok to take PRN tylenol for pain  - start on omeprazole 40mg every day x30d  - start on voltaren gel to right knee  - hgba1c 7.4 last time and now it is 7.6  - if blood sugar is less than 110, give 90u of levemier and if >110 then take the 110 units    REVIEW OF SYSTEMS:  Review of Systems   Constitutional:  Positive for fatigue.   Musculoskeletal:  Positive for arthralgias, gait problem, joint swelling and myalgias.   All other systems reviewed and are negative.    12 point review of systems negative unless stated above in HPI    VITAL SIGNS:  Vitals:    11/14/23 1253   BP: (!) 130/92   Pulse: 92   Resp: 18   SpO2: 95%       ALLERGIES:  Allergies   Allergen Reactions    Tramadol Other     Other reaction(s): GI Upset        Physical Exam  Vitals reviewed.   Constitutional:       General: She is not in acute distress.     Appearance: Normal appearance. She is not ill-appearing.   HENT:      Head: Normocephalic and atraumatic.      Right Ear: Tympanic membrane and external ear normal.      Left Ear: Tympanic membrane and external ear normal.      Nose: Nose normal.      Mouth/Throat:      Mouth: Mucous membranes are moist.      Pharynx: Oropharynx is clear.   Eyes:      Conjunctiva/sclera: Conjunctivae normal.      Pupils: Pupils are equal, round, and reactive to light.   Cardiovascular:      Rate and Rhythm: Normal rate and regular rhythm.      Heart sounds: Normal heart sounds. No murmur  "heard.  Pulmonary:      Effort: Pulmonary effort is normal. No respiratory distress.      Breath sounds: Normal breath sounds. No wheezing.   Abdominal:      General: There is no distension.      Palpations: Abdomen is soft. There is no mass.      Tenderness: There is no abdominal tenderness.   Musculoskeletal:      Cervical back: Normal range of motion and neck supple.      Right hip: Tenderness present.      Right knee: Decreased range of motion. Tenderness present.      Left knee: Decreased range of motion. Tenderness present.      Comments: Right > left knee pain   Skin:     General: Skin is warm and dry.   Neurological:      General: No focal deficit present.      Mental Status: She is alert and oriented to person, place, and time.      Sensory: No sensory deficit.      Motor: No weakness.      Coordination: Coordination normal.      Gait: Gait normal.   Psychiatric:         Mood and Affect: Mood normal.         Behavior: Behavior normal.     MEDICATIONS:  Current Outpatient Medications on File Prior to Visit   Medication Sig Dispense Refill    ALPRAZolam (Xanax) 1 mg tablet Take 1 tablet (1 mg) by mouth as needed at bedtime.      atorvastatin (Lipitor) 20 mg tablet Take 1 tablet (20 mg) by mouth once daily. 90 tablet 1    FLUoxetine (PROzac) 40 mg capsule Take 1 capsule (40 mg) by mouth once daily.      FreeStyle Janneth 2 [place] misc Use as instructed 1 each 0    FreeStyle Janneth 2 Sensor kit USE AS DIRECTED 2 each 0    insulin detemir (Levemir FlexPen) 100 unit/mL (3 mL) pen Inject 110 Units under the skin once daily at bedtime. 90 mL 0    losartan (Cozaar) 50 mg tablet Take 1 tablet (50 mg) by mouth once daily. 90 tablet 1    lurasidone (Latuda) 80 mg tablet Take 1 tablet (80 mg) by mouth once daily.      ondansetron (Zofran) 4 mg tablet Take 1 tablet (4 mg) by mouth every 6 hours during the day. 20 tablet 0    pen needle, diabetic (BD Ultra-Fine Short Pen Needle) 31 gauge x 5/16\" needle 200 each 2 times a " day. 200 each 0    [DISCONTINUED] acetaminophen-codeine (Tylenol w/ Codeine #3) 300-30 mg tablet       [DISCONTINUED] insulin lispro (HumaLOG KwikPen Insulin) 100 unit/mL injection Subcutaneous      [DISCONTINUED] Ozempic 0.25 mg or 0.5 mg (2 mg/3 mL) pen injector INJECT 0.25 MG SUBCUTANEOUSLY ONCE A WEEK 3 mL 0    [DISCONTINUED] predniSONE (Deltasone) 20 mg tablet TAKE 2 TABLETS BY MOUTH DAYS 1-7. TAKE 1 TABLET BY MOUTH DAYS 8-14. 21 tablet 0    [DISCONTINUED] sulfamethoxazole-trimethoprim (Bactrim DS) 800-160 mg tablet        No current facility-administered medications on file prior to visit.        Your medication list            Accurate as of November 14, 2023 11:59 PM. If you have any questions, ask your nurse or doctor.                START taking these medications        Instructions Last Dose Given Next Dose Due   celecoxib 200 mg capsule  Commonly known as: CeleBREX  Started by: Isaias Giraldo MD      Take 1 capsule (200 mg) by mouth once daily as needed for mild pain (1 - 3) for up to 15 days.       diclofenac sodium 1 % gel gel  Commonly known as: Voltaren  Started by: Isaias Giraldo MD      Apply 1 Application topically 4 times a day. To right knee and hip       omeprazole 20 mg DR capsule  Commonly known as: PriLOSEC  Started by: Isaias Giraldo MD      Take 2 capsules (40 mg) by mouth once daily. Do not crush or chew.              CONTINUE taking these medications        Instructions Last Dose Given Next Dose Due   ALPRAZolam 1 mg tablet  Commonly known as: Xanax           atorvastatin 20 mg tablet  Commonly known as: Lipitor      Take 1 tablet (20 mg) by mouth once daily.       FLUoxetine 40 mg capsule  Commonly known as: PROzac           FreeStyle Janneth 2 [place] misc  Generic drug: FreeStyle Janneth reader      Use as instructed       FreeStyle Janneth 2 Sensor kit  Generic drug: FreeStyle Janneth sensor system      USE AS DIRECTED       Levemir FlexPen 100 unit/mL (3 mL) pen  Generic drug: insulin  "detemir      Inject 110 Units under the skin once daily at bedtime.       losartan 50 mg tablet  Commonly known as: Cozaar      Take 1 tablet (50 mg) by mouth once daily.       lurasidone 80 mg tablet  Commonly known as: Latuda           ondansetron 4 mg tablet  Commonly known as: Zofran      Take 1 tablet (4 mg) by mouth every 6 hours during the day.       pen needle, diabetic 31 gauge x 5/16\" needle  Commonly known as: BD Ultra-Fine Short Pen Needle      200 each 2 times a day.              STOP taking these medications      acetaminophen-codeine 300-30 mg tablet  Commonly known as: Tylenol w/ Codeine #3  Stopped by: Isaias Giraldo MD        HumaLOG KwikPen Insulin 100 unit/mL injection  Generic drug: insulin lispro  Stopped by: Isaias Giraldo MD        Ozempic 0.25 mg or 0.5 mg (2 mg/3 mL) pen injector  Generic drug: semaglutide  Stopped by: Isaias Giraldo MD        predniSONE 20 mg tablet  Commonly known as: Deltasone  Stopped by: Isaias Giraldo MD        sulfamethoxazole-trimethoprim 800-160 mg tablet  Commonly known as: Bactrim DS  Stopped by: Isaias Giraldo MD                  Where to Get Your Medications        These medications were sent to [facility] [address] [place], [place] [id number]      Phone: [phone]   celecoxib 200 mg capsule  diclofenac sodium 1 % gel gel  omeprazole 20 mg DR capsule         RECENT LABS:  Lab Results   Component Value Date    WBC 8.5 07/27/2023    HGB 11.2 (L) 07/27/2023    HCT 38.5 07/27/2023     07/27/2023    CHOL 138 03/02/2023    TRIG 95 03/02/2023    HDL 38.0 (A) 03/02/2023    ALT 13 07/27/2023    AST 13 07/27/2023     07/27/2023    K 3.7 07/27/2023     07/27/2023    CREATININE 0.63 07/27/2023    BUN 10 07/27/2023    CO2 23 07/27/2023    TSH 1.52 07/27/2023    HGBA1C 7.6 (A) 11/14/2023     ASSESSMENT AND PLAN:  Assessment/Plan   Diagnoses and all orders for this visit:  Gastroesophageal " reflux disease, unspecified whether esophagitis present  -     omeprazole (PriLOSEC) 20 mg DR capsule; Take 2 capsules (40 mg) by mouth once daily. Do not crush or chew.  Type 1 diabetes mellitus without complication (CMS/[facility])  Comments:  - 7.6 today  - changed levemier insulin to If blood sugar is less than 110, give 90u of levemier and if >110 then take the 110 units  - STOP Ozempic d/t h/o DM1  Orders:  -     POCT glycosylated hemoglobin (Hb A1C) manually resulted  Chronic pain of right knee  Comments:  - ok to take PRN tylenol for pain  - no narotics  - will get xray if pain persists  Orders:  -     diclofenac sodium (Voltaren) 1 % gel gel; Apply 1 Application topically 4 times a day. To right knee and hip  -     celecoxib (CeleBREX) 200 mg capsule; Take 1 capsule (200 mg) by mouth once daily as needed for mild pain (1 - 3) for up to 15 days.      ------  Written by Ashlee Garcia RN, acting as a scribe for Dr. Lopez. This note accurately reflects the work and decisions made by Dr. Lopez.     I, Dr. Lopez, attest all medical record entries made by the scribe were under my direction and were personally dictated by me. I have reviewed the chart and agree that the record accurately reflects my performance of the history, physical exam, and assessment and plan.

## 2023-11-14 NOTE — PATIENT INSTRUCTIONS
Nice to see you today!    As discussed during our visit...  If blood sugar is less than 110, give 90u of levemier and if >110 then take the 110 units

## 2023-11-16 PROBLEM — K21.9 GASTROESOPHAGEAL REFLUX DISEASE: Status: ACTIVE | Noted: 2023-11-16

## 2023-11-16 ASSESSMENT — ENCOUNTER SYMPTOMS
JOINT SWELLING: 1
FATIGUE: 1
MYALGIAS: 1
ARTHRALGIAS: 1

## 2023-11-28 DIAGNOSIS — E10.9 TYPE 1 DIABETES MELLITUS WITHOUT COMPLICATION (MULTI): ICD-10-CM

## 2023-11-29 RX ORDER — FLASH GLUCOSE SENSOR
KIT MISCELLANEOUS
Qty: 2 EACH | Refills: 0 | Status: SHIPPED | OUTPATIENT
Start: 2023-11-29 | End: 2023-12-27

## 2023-12-10 DIAGNOSIS — G89.29 CHRONIC PAIN OF RIGHT KNEE: ICD-10-CM

## 2023-12-10 DIAGNOSIS — M25.561 CHRONIC PAIN OF RIGHT KNEE: ICD-10-CM

## 2023-12-11 RX ORDER — CELECOXIB 200 MG/1
CAPSULE ORAL
Qty: 15 CAPSULE | Refills: 0 | Status: SHIPPED | OUTPATIENT
Start: 2023-12-11

## 2023-12-27 DIAGNOSIS — E10.9 TYPE 1 DIABETES MELLITUS WITHOUT COMPLICATION (MULTI): ICD-10-CM

## 2023-12-27 RX ORDER — FLASH GLUCOSE SENSOR
KIT MISCELLANEOUS
Qty: 2 EACH | Refills: 0 | Status: SHIPPED | OUTPATIENT
Start: 2023-12-27 | End: 2024-01-22

## 2024-01-06 DIAGNOSIS — K21.9 GASTROESOPHAGEAL REFLUX DISEASE, UNSPECIFIED WHETHER ESOPHAGITIS PRESENT: ICD-10-CM

## 2024-01-08 DIAGNOSIS — K21.9 GASTROESOPHAGEAL REFLUX DISEASE, UNSPECIFIED WHETHER ESOPHAGITIS PRESENT: ICD-10-CM

## 2024-01-08 RX ORDER — OMEPRAZOLE 20 MG/1
CAPSULE, DELAYED RELEASE ORAL
Qty: 180 CAPSULE | Refills: 0 | Status: SHIPPED | OUTPATIENT
Start: 2024-01-08

## 2024-01-08 RX ORDER — OMEPRAZOLE 20 MG/1
CAPSULE, DELAYED RELEASE ORAL
Qty: 60 CAPSULE | Refills: 0 | Status: SHIPPED | OUTPATIENT
Start: 2024-01-08 | End: 2024-01-08 | Stop reason: SDUPTHER

## 2024-01-22 DIAGNOSIS — E78.00 HYPERCHOLESTEROLEMIA: ICD-10-CM

## 2024-01-22 DIAGNOSIS — E11.9 TYPE 2 DIABETES MELLITUS WITHOUT COMPLICATION, WITH LONG-TERM CURRENT USE OF INSULIN (MULTI): ICD-10-CM

## 2024-01-22 DIAGNOSIS — E10.9 TYPE 1 DIABETES MELLITUS WITHOUT COMPLICATION (MULTI): ICD-10-CM

## 2024-01-22 DIAGNOSIS — Z79.4 TYPE 2 DIABETES MELLITUS WITHOUT COMPLICATION, WITH LONG-TERM CURRENT USE OF INSULIN (MULTI): ICD-10-CM

## 2024-01-22 DIAGNOSIS — I10 BENIGN HYPERTENSION: ICD-10-CM

## 2024-01-22 RX ORDER — FLASH GLUCOSE SENSOR
KIT MISCELLANEOUS
Qty: 2 EACH | Refills: 0 | Status: SHIPPED | OUTPATIENT
Start: 2024-01-22 | End: 2024-02-16

## 2024-01-29 RX ORDER — ATORVASTATIN CALCIUM 20 MG/1
20 TABLET, FILM COATED ORAL DAILY
Qty: 90 TABLET | Refills: 0 | Status: SHIPPED | OUTPATIENT
Start: 2024-01-29 | End: 2024-05-01

## 2024-01-29 RX ORDER — INSULIN DETEMIR 100 [IU]/ML
110 INJECTION, SOLUTION SUBCUTANEOUS NIGHTLY
Qty: 90 ML | Refills: 0 | Status: SHIPPED | OUTPATIENT
Start: 2024-01-29 | End: 2024-03-25 | Stop reason: SDUPTHER

## 2024-01-29 RX ORDER — LOSARTAN POTASSIUM 50 MG/1
50 TABLET ORAL DAILY
Qty: 90 TABLET | Refills: 0 | Status: SHIPPED | OUTPATIENT
Start: 2024-01-29 | End: 2024-05-01

## 2024-02-04 DIAGNOSIS — E11.9 TYPE 2 DIABETES MELLITUS WITHOUT COMPLICATION, WITH LONG-TERM CURRENT USE OF INSULIN (MULTI): ICD-10-CM

## 2024-02-04 DIAGNOSIS — Z79.4 TYPE 2 DIABETES MELLITUS WITHOUT COMPLICATION, WITH LONG-TERM CURRENT USE OF INSULIN (MULTI): ICD-10-CM

## 2024-02-05 RX ORDER — PEN NEEDLE, DIABETIC 31 GX5/16"
NEEDLE, DISPOSABLE MISCELLANEOUS
Qty: 200 EACH | Refills: 0 | Status: SHIPPED | OUTPATIENT
Start: 2024-02-05 | End: 2024-03-25 | Stop reason: SDUPTHER

## 2024-02-15 DIAGNOSIS — E10.9 TYPE 1 DIABETES MELLITUS WITHOUT COMPLICATION (MULTI): ICD-10-CM

## 2024-02-16 RX ORDER — FLASH GLUCOSE SENSOR
KIT MISCELLANEOUS
Qty: 2 EACH | Refills: 0 | Status: SHIPPED | OUTPATIENT
Start: 2024-02-16 | End: 2024-03-22

## 2024-03-05 ENCOUNTER — TELEPHONE (OUTPATIENT)
Dept: PRIMARY CARE | Facility: CLINIC | Age: 54
End: 2024-03-05
Payer: MEDICARE

## 2024-03-22 DIAGNOSIS — E10.9 TYPE 1 DIABETES MELLITUS WITHOUT COMPLICATION (MULTI): ICD-10-CM

## 2024-03-22 RX ORDER — FLASH GLUCOSE SENSOR
KIT MISCELLANEOUS
Qty: 2 EACH | Refills: 0 | Status: SHIPPED | OUTPATIENT
Start: 2024-03-22 | End: 2024-03-25 | Stop reason: SDUPTHER

## 2024-03-25 ENCOUNTER — TELEPHONE (OUTPATIENT)
Dept: OBSTETRICS AND GYNECOLOGY | Facility: CLINIC | Age: 54
End: 2024-03-25

## 2024-03-25 ENCOUNTER — OFFICE VISIT (OUTPATIENT)
Dept: PRIMARY CARE | Facility: CLINIC | Age: 54
End: 2024-03-25
Payer: MEDICARE

## 2024-03-25 VITALS
SYSTOLIC BLOOD PRESSURE: 130 MMHG | HEIGHT: 60 IN | DIASTOLIC BLOOD PRESSURE: 76 MMHG | RESPIRATION RATE: 16 BRPM | BODY MASS INDEX: 43.41 KG/M2 | HEART RATE: 110 BPM | WEIGHT: 221.1 LBS | OXYGEN SATURATION: 98 %

## 2024-03-25 DIAGNOSIS — E11.9 TYPE 2 DIABETES MELLITUS WITHOUT COMPLICATION, WITHOUT LONG-TERM CURRENT USE OF INSULIN (MULTI): ICD-10-CM

## 2024-03-25 DIAGNOSIS — K62.89 PERIANAL MASS: ICD-10-CM

## 2024-03-25 DIAGNOSIS — E11.9 TYPE 2 DIABETES MELLITUS WITHOUT COMPLICATION, WITH LONG-TERM CURRENT USE OF INSULIN (MULTI): ICD-10-CM

## 2024-03-25 DIAGNOSIS — E11.9 CONTROLLED TYPE 2 DIABETES MELLITUS WITHOUT COMPLICATION, WITHOUT LONG-TERM CURRENT USE OF INSULIN (MULTI): ICD-10-CM

## 2024-03-25 DIAGNOSIS — Z79.4 TYPE 2 DIABETES MELLITUS WITHOUT COMPLICATION, WITH LONG-TERM CURRENT USE OF INSULIN (MULTI): ICD-10-CM

## 2024-03-25 LAB — POC HEMOGLOBIN A1C: 7.3 % (ref 4.2–6.5)

## 2024-03-25 PROCEDURE — 3008F BODY MASS INDEX DOCD: CPT | Performed by: INTERNAL MEDICINE

## 2024-03-25 PROCEDURE — 1036F TOBACCO NON-USER: CPT | Performed by: INTERNAL MEDICINE

## 2024-03-25 PROCEDURE — 83036 HEMOGLOBIN GLYCOSYLATED A1C: CPT | Performed by: INTERNAL MEDICINE

## 2024-03-25 PROCEDURE — 3078F DIAST BP <80 MM HG: CPT | Performed by: INTERNAL MEDICINE

## 2024-03-25 PROCEDURE — 4010F ACE/ARB THERAPY RXD/TAKEN: CPT | Performed by: INTERNAL MEDICINE

## 2024-03-25 PROCEDURE — 3075F SYST BP GE 130 - 139MM HG: CPT | Performed by: INTERNAL MEDICINE

## 2024-03-25 PROCEDURE — 99213 OFFICE O/P EST LOW 20 MIN: CPT | Performed by: INTERNAL MEDICINE

## 2024-03-25 RX ORDER — LURASIDONE HYDROCHLORIDE 60 MG/1
60 TABLET, FILM COATED ORAL
COMMUNITY
Start: 2024-03-14

## 2024-03-25 RX ORDER — INSULIN DETEMIR 100 [IU]/ML
110 INJECTION, SOLUTION SUBCUTANEOUS NIGHTLY
Qty: 90 ML | Refills: 0 | Status: SHIPPED | OUTPATIENT
Start: 2024-03-25 | End: 2024-04-15

## 2024-03-25 RX ORDER — FLASH GLUCOSE SENSOR
KIT MISCELLANEOUS
Qty: 2 EACH | Refills: 0 | Status: SHIPPED | OUTPATIENT
Start: 2024-03-25 | End: 2024-04-25

## 2024-03-25 RX ORDER — PEN NEEDLE, DIABETIC 30 GX3/16"
NEEDLE, DISPOSABLE MISCELLANEOUS
Qty: 200 EACH | Refills: 0 | Status: SHIPPED | OUTPATIENT
Start: 2024-03-25 | End: 2024-05-01

## 2024-03-25 ASSESSMENT — PAIN SCALES - GENERAL: PAINLEVEL: 10-WORST PAIN EVER

## 2024-03-25 ASSESSMENT — ENCOUNTER SYMPTOMS
GASTROINTESTINAL NEGATIVE: 1
CONSTITUTIONAL NEGATIVE: 1
MUSCULOSKELETAL NEGATIVE: 1
NEUROLOGICAL NEGATIVE: 1
PSYCHIATRIC NEGATIVE: 1
RESPIRATORY NEGATIVE: 1
CARDIOVASCULAR NEGATIVE: 1

## 2024-03-25 ASSESSMENT — PATIENT HEALTH QUESTIONNAIRE - PHQ9
1. LITTLE INTEREST OR PLEASURE IN DOING THINGS: NOT AT ALL
2. FEELING DOWN, DEPRESSED OR HOPELESS: NOT AT ALL
SUM OF ALL RESPONSES TO PHQ9 QUESTIONS 1 AND 2: 0

## 2024-03-25 ASSESSMENT — COLUMBIA-SUICIDE SEVERITY RATING SCALE - C-SSRS
6. HAVE YOU EVER DONE ANYTHING, STARTED TO DO ANYTHING, OR PREPARED TO DO ANYTHING TO END YOUR LIFE?: NO
2. HAVE YOU ACTUALLY HAD ANY THOUGHTS OF KILLING YOURSELF?: NO
1. IN THE PAST MONTH, HAVE YOU WISHED YOU WERE DEAD OR WISHED YOU COULD GO TO SLEEP AND NOT WAKE UP?: NO

## 2024-03-25 NOTE — PROGRESS NOTES
Patient ID: She states that she has a very painful area in her vaginal area. She states that she feels that it is connected to the device she had implanted for her urinary symptoms. She states she developed what she thought was a boil about 4 days ago, and it continues to grow and it is very painful.  She states it has been draining. She states that she can feel the implanted device and it is painful.     Last Office Visit: 11/14/23 for acute right knee pain, declined steroids, was prescribed Celebrex, Voltaren gel and advised to take Tylenol PRN and was also started on Omeprazole for GERD    HPI Km Tam is a 53 y.o. female with PMH remarkable for chronic back pain, HTN, HLD, DM2, schizophrenia, PTSD who presents to the office today for elevated blood sugars. Her most recent hgbA1c was 7.6 on 11/14/23. She was advised at that time that if blood sugar is less than 110, give 90u of levemier and if >110 then take the 110 units.     Social History     Tobacco Use    Smoking status: Former     Types: Cigarettes    Smokeless tobacco: Never   Substance Use Topics    Alcohol use: Never      Review of Systems   Constitutional: Negative.    HENT: Negative.     Respiratory: Negative.     Cardiovascular: Negative.    Gastrointestinal: Negative.    Genitourinary: Negative.    Musculoskeletal: Negative.    Skin:         Painful boil to vaginal area   Neurological: Negative.    Psychiatric/Behavioral: Negative.       Visit Vitals  Vitals:    03/25/24 0951   BP: 130/76   BP Location: Right arm   Pulse: 110   Resp: 16   SpO2: 98%   Weight: 100 kg (221 lb 1.6 oz)   Height: 1.524 m (5')     Smoking Status Former     Allergies   Allergen Reactions    Tramadol Other     Other reaction(s): GI Upset      Physical Exam  Vitals reviewed.   Constitutional:       Appearance: Normal appearance. She is obese.   HENT:      Head: Normocephalic and atraumatic.   Cardiovascular:      Rate and Rhythm: Normal rate and regular rhythm.       "Pulses: Normal pulses.      Heart sounds: Normal heart sounds.   Pulmonary:      Effort: Pulmonary effort is normal.      Breath sounds: Normal breath sounds.   Abdominal:      General: Bowel sounds are normal.      Palpations: Abdomen is soft.   Skin:     Comments: There is a large oval shaped mass in perianal area approximately the size of a baseball, very tender to touch, no drainage, warm to touch   Neurological:      General: No focal deficit present.      Mental Status: She is alert and oriented to person, place, and time.   Psychiatric:         Mood and Affect: Mood normal.         Behavior: Behavior normal.       MEDICATIONS:  Current Outpatient Medications   Medication Instructions    ALPRAZolam (XANAX) 1 mg, oral, Nightly PRN    atorvastatin (LIPITOR) 20 mg, oral, Daily    BD Ultra-Fine Short Pen Needle 31 gauge x 5/16\" needle USE 1  WITH INSULIN PEN TWICE DAILY    celecoxib (CeleBREX) 200 mg capsule TAKE 1 CAPSULE BY MOUTH ONCE DAILY AS NEEDED FOR  MILD  PAIN  (1-3)  FOR  UP  TO  15  DAYS    diclofenac sodium (Voltaren) 1 % gel gel 1 Application, Topical, 4 times daily, To right knee and hip    FLUoxetine (PROZAC) 40 mg, oral, Daily    FreeStyle Janneth 2 [place] misc Use as instructed    FreeStyle Janneth 2 Sensor kit USE AS DIRECTED    Levemir FlexPen 110 Units, subcutaneous, Nightly    losartan (COZAAR) 50 mg, oral, Daily    lurasidone (Latuda) 80 mg tablet 1 tablet, oral, Daily    omeprazole (PriLOSEC) 20 mg DR capsule TAKE 2 CAPSULES BY MOUTH ONCE DAILY. DO NOT CRUSH OR CHEW.    ondansetron (ZOFRAN) 4 mg, oral, Every 6 hours during day        Lab Results   Component Value Date    WBC 8.5 07/27/2023    HGB 11.2 (L) 07/27/2023    HCT 38.5 07/27/2023     07/27/2023    CHOL 138 03/02/2023    TRIG 95 03/02/2023    HDL 38.0 (A) 03/02/2023    ALT 13 07/27/2023    AST 13 07/27/2023     07/27/2023    K 3.7 07/27/2023     07/27/2023    CREATININE 0.63 07/27/2023    BUN 10 07/27/2023    CO2 23 " 07/27/2023    TSH 1.52 07/27/2023    HGBA1C 7.6 (A) 11/14/2023     ASSESSMENT AND PLAN:  Assessment/Plan   Perianal mass in patient with urological device implanted for UI, concern about abscess and device infection  - patient is diabetic and is high risk for sepsis and complication of infection  - sent to [facility] ER for further evaluation, advised to go immediately from here   - ER called at [facility], and information was given    Type 2 DM  - HgbA1c today is 7.3 which is improved from previous, 7.6  - continue with current dose of Levemir insulin  - need to get abscess treated and then patient advised to return to office for follow up  ------  Written by Di Pabon LPN, acting as a scribe for Dr. Lopez. This note accurately reflects the work and decisions made by Dr. Lopez.     I, Dr. Lopez, attest all medical record entries made by the scribe were under my direction and were personally dictated by me. I have reviewed the chart and agree that the record accurately reflects my performance of the history, physical exam, and assessment and plan.

## 2024-03-25 NOTE — TELEPHONE ENCOUNTER
Left phone message for Km Tam asking for more information to address issue for which she called. Left voicemail with office phone number [phone]

## 2024-03-25 NOTE — PROGRESS NOTES
Patient states that she has a boil to her right perineum area that has been there for a while has gotten bigger and is now draining and causing extreme pain. Patient states that she has tried warm compress with no relief.

## 2024-03-25 NOTE — PATIENT INSTRUCTIONS
It was great to see you in the office today! Here is what we discussed at your visit today:  Please continue to take your current medications   Go to [facility] ER ASAP as discussed to have abscess evaluated

## 2024-03-28 ENCOUNTER — PATIENT OUTREACH (OUTPATIENT)
Dept: PRIMARY CARE | Facility: CLINIC | Age: 54
End: 2024-03-28
Payer: MEDICARE

## 2024-03-28 ENCOUNTER — DOCUMENTATION (OUTPATIENT)
Dept: PRIMARY CARE | Facility: CLINIC | Age: 54
End: 2024-03-28
Payer: MEDICARE

## 2024-03-28 NOTE — PROGRESS NOTES
Discharge Facility: [facility]     Discharge Diagnosis:    #Sepsis secondary to right gluteal abscess  > s/p I/D in the OR on 2/26.   > Wound cultures pending: Gram stain with GPCs in pairs and  Grm pos bacilli.  - Augmentin 1G BD x 5 days on discharge   - COD at home with daughter possible per patient.   - follow up in the wound care clinic in one week.     #Type 2 diabetes  - Continued prior regimen.   - Follow up with PCP as planned.      #Microcytic anemia  > Combination of possible thalassemia and iron deficiency.  -Outpatient follow-up with hematology     #Hypertension: Losartan 50mg OD     # Hyperlipidemia: Atorvastatin 20 mg daily     #Depression/schizophrenia/PTSD: Fluoxetine 40 mg daily.    Admission Date: 3/25/2024   Discharge Date:  3/27/2024     PCP Appointment Date: Tasked to office     Specialist Appointment Date:     Follow-Up Appointment        When: In 1 week     Patient/Parents to call for appointment?: Yes     Calos Olivia MD   [phone]    [address] [facility] [id number]        PCP Requested Referral     Follow-Up Appointment       When: In 4 weeks     Patient/Parents to call for appointment?: Yes     Sosa Whaley MD   [phone]    [address] [facility] [address]        PCP Requested Referral        Hospital Encounter and Summary: Linked     See discharge assessment below for further details     Engagement  Call Start Time: 1102 (3/28/2024 11:02 AM)    Medications  Medications reviewed with patient/caregiver?: Yes (3/28/2024 11:02 AM)  Is the patient having any side effects they believe may be caused by any medication additions or changes?: No (3/28/2024 11:02 AM)  Does the patient have all medications ordered at discharge?: Yes (3/28/2024 11:02 AM)  Care Management Interventions: No intervention needed (3/28/2024 11:02 AM)  Prescription Comments: New meds reviewed ,amoxicillin-clavulanate potassium 875-125 mg per tablet  Commonly known as: AUGMENTIN  Take 1 tablet by mouth  every 12 hours for 10 doses.     losartan 50 mg tablet  Commonly known as: COZAAR  Take 1 tablet by mouth once daily.  Start taking on: March 28, 2024 (3/28/2024 11:02 AM)  Is the patient taking all medications as directed (includes completed medication regime)?: -- (Patient states she is following DC med list) (3/28/2024 11:02 AM)  Care Management Interventions: Provided patient education (3/28/2024 11:02 AM)  Medication Comments: Patient has no questions or concerns at this time (3/28/2024 11:02 AM)    Appointments  Does the patient have a primary care provider?: Yes (3/28/2024 11:02 AM)  Care Management Interventions: Educated patient on importance of making appointment (Tasked  to office) (3/28/2024 11:02 AM)  Has the patient kept scheduled appointments due by today?: Yes (3/28/2024 11:02 AM)  Care Management Interventions: Advised to schedule with specialist (Calos Olivia MD  [phone]      PCP Requested Referral  Follow-Up Appointment    When: In 4 weeks    Sosa Whaley MD  [phone]   l) (3/28/2024 11:02 AM)    Self Management  What is the home health agency?: NA (3/28/2024 11:02 AM)  What Durable Medical Equipment (DME) was ordered?: NA (3/28/2024 11:02 AM)    Patient Teaching  Does the patient have access to their discharge instructions?: Yes (3/28/2024 11:02 AM)  Care Management Interventions: Reviewed instructions with patient (3/28/2024 11:02 AM)  What is the patient's perception of their health status since discharge?: Improving (3/28/2024 11:02 AM)  Is the patient/caregiver able to teach back the hierarchy of who to call/visit for symptoms/problems? PCP, Specialist, Home Health nurse, Urgent Care, ED, 911: Yes (3/28/2024 11:02 AM)  Patient/Caregiver Education Comments: Patient aware to call providers for any questions concerns or change in condition , aware of importance of scheduling F/Us as instructed per DC summary , patient states DTR performing dressing changes , following  instructions provided (3/28/2024 11:02 AM)

## 2024-03-29 ENCOUNTER — TELEPHONE (OUTPATIENT)
Dept: OBSTETRICS AND GYNECOLOGY | Facility: CLINIC | Age: 54
End: 2024-03-29
Payer: MEDICARE

## 2024-03-29 NOTE — TELEPHONE ENCOUNTER
Pt called to report her episode with hip abscess near implant site. Symptoms (Chills/hot flashes, pain, swelling) started on 3/20 and worsened over the weekend. She called our office Mon AM and return call made that afternoon, but pt went to PCP in the meantime who instructed her to go to the ER. Found to have abscess in right hip, admitted, had ATB and surgical debridement on Tue 3/26. She will follow-up with wound care and appt given to see Dr Ho on 4/9 (first available at [facility]).

## 2024-04-05 PROBLEM — R53.83 FATIGUE: Status: ACTIVE | Noted: 2023-07-27

## 2024-04-05 PROBLEM — R11.0 NAUSEA: Status: ACTIVE | Noted: 2024-04-05

## 2024-04-05 PROBLEM — Z86.59 HISTORY OF DEPRESSION: Status: ACTIVE | Noted: 2024-04-05

## 2024-04-05 PROBLEM — L02.91 ABSCESS: Status: ACTIVE | Noted: 2024-03-25

## 2024-04-05 PROBLEM — Z86.39 HISTORY OF DIABETES MELLITUS: Status: ACTIVE | Noted: 2024-04-05

## 2024-04-08 ENCOUNTER — OFFICE VISIT (OUTPATIENT)
Dept: PRIMARY CARE | Facility: CLINIC | Age: 54
End: 2024-04-08
Payer: MEDICARE

## 2024-04-08 VITALS
BODY MASS INDEX: 43.98 KG/M2 | HEIGHT: 60 IN | HEART RATE: 90 BPM | OXYGEN SATURATION: 97 % | DIASTOLIC BLOOD PRESSURE: 86 MMHG | SYSTOLIC BLOOD PRESSURE: 121 MMHG | WEIGHT: 224 LBS

## 2024-04-08 DIAGNOSIS — E10.9 TYPE 1 DIABETES MELLITUS WITHOUT COMPLICATION (MULTI): ICD-10-CM

## 2024-04-08 DIAGNOSIS — L02.91 ABSCESS: ICD-10-CM

## 2024-04-08 DIAGNOSIS — Z09 HOSPITAL DISCHARGE FOLLOW-UP: ICD-10-CM

## 2024-04-08 DIAGNOSIS — E66.01 CLASS 3 SEVERE OBESITY WITH SERIOUS COMORBIDITY AND BODY MASS INDEX (BMI) OF 40.0 TO 44.9 IN ADULT, UNSPECIFIED OBESITY TYPE (MULTI): ICD-10-CM

## 2024-04-08 PROBLEM — Z79.4 TYPE 2 DIABETES MELLITUS WITHOUT COMPLICATION, WITH LONG-TERM CURRENT USE OF INSULIN (MULTI): Status: RESOLVED | Noted: 2023-07-27 | Resolved: 2024-04-08

## 2024-04-08 PROBLEM — E11.9 TYPE 2 DIABETES MELLITUS WITHOUT COMPLICATION, WITH LONG-TERM CURRENT USE OF INSULIN (MULTI): Status: RESOLVED | Noted: 2023-07-27 | Resolved: 2024-04-08

## 2024-04-08 PROCEDURE — 4010F ACE/ARB THERAPY RXD/TAKEN: CPT | Performed by: INTERNAL MEDICINE

## 2024-04-08 PROCEDURE — 3008F BODY MASS INDEX DOCD: CPT | Performed by: INTERNAL MEDICINE

## 2024-04-08 PROCEDURE — 3079F DIAST BP 80-89 MM HG: CPT | Performed by: INTERNAL MEDICINE

## 2024-04-08 PROCEDURE — 3074F SYST BP LT 130 MM HG: CPT | Performed by: INTERNAL MEDICINE

## 2024-04-08 PROCEDURE — 1036F TOBACCO NON-USER: CPT | Performed by: INTERNAL MEDICINE

## 2024-04-08 PROCEDURE — 99495 TRANSJ CARE MGMT MOD F2F 14D: CPT | Performed by: INTERNAL MEDICINE

## 2024-04-08 RX ORDER — TOPIRAMATE 25 MG/1
25 TABLET ORAL 2 TIMES DAILY
Qty: 60 TABLET | Refills: 5 | Status: SHIPPED | OUTPATIENT
Start: 2024-04-08 | End: 2024-10-05

## 2024-04-08 RX ORDER — METRONIDAZOLE 500 MG/1
500 TABLET ORAL EVERY 8 HOURS
COMMUNITY
Start: 2024-04-01 | End: 2024-04-08

## 2024-04-08 RX ORDER — INSULIN ASPART 100 [IU]/ML
2-10 INJECTION, SOLUTION INTRAVENOUS; SUBCUTANEOUS 2 TIMES DAILY PRN
Qty: 10 ML | Refills: 3 | Status: SHIPPED | OUTPATIENT
Start: 2024-04-08 | End: 2025-04-08

## 2024-04-08 ASSESSMENT — ENCOUNTER SYMPTOMS
RESPIRATORY NEGATIVE: 1
NEUROLOGICAL NEGATIVE: 1
CARDIOVASCULAR NEGATIVE: 1
GASTROINTESTINAL NEGATIVE: 1
PSYCHIATRIC NEGATIVE: 1
CONSTITUTIONAL NEGATIVE: 1

## 2024-04-08 ASSESSMENT — PATIENT HEALTH QUESTIONNAIRE - PHQ9
SUM OF ALL RESPONSES TO PHQ9 QUESTIONS 1 AND 2: 0
1. LITTLE INTEREST OR PLEASURE IN DOING THINGS: NOT AT ALL
2. FEELING DOWN, DEPRESSED OR HOPELESS: NOT AT ALL

## 2024-04-08 ASSESSMENT — PAIN SCALES - GENERAL: PAINLEVEL: 0-NO PAIN

## 2024-04-08 NOTE — PATIENT INSTRUCTIONS
It was great to see you in the office today! Here is what we discussed at your visit today:  Glad you are feeling better  We will send in prescriptions for Topamax to help with weight loss, use as directed   Call office if you experience any side effects of medication or if you feel no effect  We will send in prescription for fast acting insulin to take according to your blood sugars as follows:  Check your blood sugar three times daily prior to meals and use regular insulin as follows:   If your blood sugar is:   151-200, inject 2 units Regular insulin  ... 201-250, inject 4 units regular insulin  ... 251-300, inject 6 units regular insulin  ... 301-350, inject 8 units regular insulin  ... 351-400, inject 10 units regular insulin  ... 401-999, inject 10 units regular insulin    Follow up in two months

## 2024-04-08 NOTE — PROGRESS NOTES
Patient ID: She states she is feeing much better. She states she has an appointment at wound clinic today. She states she did not apply dressing over area this am.  Her hgbA1c while inpatient was 8.0. She would like to discuss starting on medication for weight loss and to help her get sugars under better control. She confirms today that she is type 1 diabetic, states she noticed in her chart several times type 2 DM has been listed. She states she was prescribed Ozempic in the past, but never started it. She is currently taking Levemir 110 units every night, states she checks her sugar frequently during the day.      Hospitalization Discharge Follow Up:  Date(s): 3/25-->03/27/24  Location: [facility]  Reason Presented to ER: large abscess  Synopsis: sepsis 2/2 right gluteal abscess  Recommended FU: PCP, wound clinic    HPI Km Tam is a 53 y.o. female with PMH remarkable for chronic back pain, HTN, HLD, DM2, schizophrenia, PTSD  who presents to the office today for hospital follow up.  She was previously seen in office on 03/25/24, found to have large perianal mass and was sent to ER from office for further evaluation. She was admitted to [facility] on 03/25/24, CT abdomen and pelvis revealed perianal inflammatory  changes on right ith a small amount of fat with no fluids. General surgery was consulted, s/p I & D in OR on 03/26/24, cultures were obtained at that time. Per hospital notes, wound cultures gram stain with GPCs in pairs and gram positive bacilli. She received IV Vanco while inpatient, was discharged to home on PO Augmentin, advised to follow up in wound care clinic in one week.      Social History     Tobacco Use    Smoking status: Former     Types: Cigarettes    Smokeless tobacco: Never   Substance Use Topics    Alcohol use: Never    Drug use: Never     Immunization History   Administered Date(s) Administered    Influenza, seasonal, injectable, preservative free 10/17/2011, 08/26/2016    Pfizer Purple Cap  SARS-CoV-2 04/09/2021, 04/30/2021     Review of Systems   Constitutional: Negative.    HENT: Negative.     Respiratory: Negative.     Cardiovascular: Negative.    Gastrointestinal: Negative.    Genitourinary: Negative.    Skin:         S/P I+D perianal abscess   Neurological: Negative.    Psychiatric/Behavioral: Negative.       Visit Vitals  Vitals:    04/08/24 0950   BP: 121/86   BP Location: Left arm   Pulse: 90   SpO2: 97%   Weight: 102 kg (224 lb)   Height: 1.524 m (5')     OB Status Premenopausal   Smoking Status Former     Allergies   Allergen Reactions    Tramadol Other     Other reaction(s): GI Upset      Physical Exam  Vitals reviewed.   Constitutional:       Appearance: Normal appearance.   HENT:      Head: Normocephalic and atraumatic.   Cardiovascular:      Rate and Rhythm: Normal rate and regular rhythm.      Pulses: Normal pulses.      Heart sounds: Normal heart sounds.   Pulmonary:      Effort: Pulmonary effort is normal.      Breath sounds: Normal breath sounds.   Abdominal:      General: Bowel sounds are normal.      Palpations: Abdomen is soft.   Musculoskeletal:         General: Normal range of motion.   Skin:     Comments: Abscess clean, no drainage, no signs of infection, no mass or fluctuation   Neurological:      General: No focal deficit present.      Mental Status: She is alert and oriented to person, place, and time.   Psychiatric:         Mood and Affect: Mood normal.         Behavior: Behavior normal.         Current Outpatient Medications   Medication Instructions    ALPRAZolam (XANAX) 1 mg, oral, Nightly PRN    atorvastatin (LIPITOR) 20 mg, oral, Daily    celecoxib (CeleBREX) 200 mg capsule TAKE 1 CAPSULE BY MOUTH ONCE DAILY AS NEEDED FOR  MILD  PAIN  (1-3)  FOR  UP  TO  15  DAYS    diclofenac sodium (Voltaren) 1 % gel gel 1 Application, Topical, 4 times daily, To right knee and hip    flash glucose sensor kit (FreeStyle Janneth 2 Sensor) kit Use as instructed    FLUoxetine (PROZAC) 40  "mg, oral, Daily    FreeStyle Janneth 2 [place] misc Use as instructed    Levemir FlexPen 110 Units, subcutaneous, Nightly    losartan (COZAAR) 50 mg, oral, Daily    lurasidone (LATUDA) 60 mg, oral, Daily with evening meal    omeprazole (PriLOSEC) 20 mg DR capsule TAKE 2 CAPSULES BY MOUTH ONCE DAILY. DO NOT CRUSH OR CHEW.    ondansetron (ZOFRAN) 4 mg, oral, Every 6 hours during day    pen needle, diabetic (BD Ultra-Fine Short Pen Needle) 31 gauge x 5/16\" needle USE 1  WITH INSULIN PEN TWICE DAILY        Lab Results   Component Value Date    WBC 8.5 07/27/2023    HGB 11.2 (L) 07/27/2023    HCT 38.5 07/27/2023     07/27/2023    CHOL 138 03/02/2023    TRIG 95 03/02/2023    HDL 38.0 (A) 03/02/2023    ALT 13 07/27/2023    AST 13 07/27/2023     07/27/2023    K 3.7 07/27/2023     07/27/2023    CREATININE 0.63 07/27/2023    BUN 10 07/27/2023    CO2 23 07/27/2023    TSH 1.52 07/27/2023    HGBA1C 8.0 (H) 03/26/2024     Assessment/Plan   Diagnoses and all orders for this visit: Hospital follow up  Km was seen today for hospital follow-up.    Abscess  - she completed course of PO antibiotics  - assessed area and it is healing nicely, no signs of infection  - continue to follow up with wound clinic    Class 3 severe obesity with serious comorbidity and body mass index (BMI) of 40.0 to 44.9 in adult, unspecified obesity type (CMS/HCC)  -     start topiramate (Topamax) 25 mg tablet; Take 1 tablet (25 mg) by mouth 2 times a day.    Type 1 diabetes mellitus without complication (CMS/[facility])  -     start insulin aspart (NovoLOG Flexpen U-100 Insulin) 100 unit/mL (3 mL) pen; Inject 2-10 Units under the skin 2 times a day as needed before meals for high blood sugar (insulin per MISS as directed). If your blood sugar is 151-200, inject 2 units ... 201-250, inject 4 units ... 251-300, inject 6 units ... 301-350, inject 8 units ... 351-400, inject 10 units ... 401-999, inject 10 units  -     continue with Levemir insulin, " current dose 110 units at bedtime   -     advised to follow up in three months, will discuss adjusting insulin doses after she gets HgbA1c in May, advised to schedule nurse visit in May for this if not done at endocrinology apt next month    --------------------  Written by Di Pabon LPN, acting as a scribe for Dr. Lopez. This note accurately reflects the work and decisions made by Dr. Lopez.     I, Dr. Lopez, attest all medical record entries made by the scribe were under my direction and were personally dictated by me. I have reviewed the chart and agree that the record accurately reflects my performance of the history, physical exam, and assessment and plan.

## 2024-04-09 ENCOUNTER — APPOINTMENT (OUTPATIENT)
Dept: OBSTETRICS AND GYNECOLOGY | Facility: CLINIC | Age: 54
End: 2024-04-09
Payer: MEDICARE

## 2024-04-11 ENCOUNTER — PATIENT OUTREACH (OUTPATIENT)
Dept: PRIMARY CARE | Facility: CLINIC | Age: 54
End: 2024-04-11
Payer: MEDICARE

## 2024-04-11 NOTE — PROGRESS NOTES
Unable to reach patient for call back after patient's follow up appointment with PCP  4/8/2024     LVM with call back number for patient to call if needed   If no voicemail available call attempts x 2 were made to contact the patient to assist with any questions or concerns patient may have.    L/M Encouraged patient to call providers for any any questions concerns or change in condition.

## 2024-04-15 DIAGNOSIS — Z79.4 TYPE 2 DIABETES MELLITUS WITHOUT COMPLICATION, WITH LONG-TERM CURRENT USE OF INSULIN (MULTI): ICD-10-CM

## 2024-04-15 DIAGNOSIS — E11.9 TYPE 2 DIABETES MELLITUS WITHOUT COMPLICATION, WITH LONG-TERM CURRENT USE OF INSULIN (MULTI): ICD-10-CM

## 2024-04-15 RX ORDER — INSULIN DETEMIR 100 [IU]/ML
INJECTION, SOLUTION SUBCUTANEOUS
Qty: 90 ML | Refills: 0 | Status: SHIPPED | OUTPATIENT
Start: 2024-04-15

## 2024-04-16 ENCOUNTER — APPOINTMENT (OUTPATIENT)
Dept: OBSTETRICS AND GYNECOLOGY | Facility: CLINIC | Age: 54
End: 2024-04-16
Payer: MEDICARE

## 2024-04-25 ENCOUNTER — TELEPHONE (OUTPATIENT)
Dept: PRIMARY CARE | Facility: CLINIC | Age: 54
End: 2024-04-25
Payer: MEDICARE

## 2024-04-25 ENCOUNTER — PATIENT OUTREACH (OUTPATIENT)
Dept: PRIMARY CARE | Facility: CLINIC | Age: 54
End: 2024-04-25
Payer: MEDICARE

## 2024-04-25 DIAGNOSIS — E66.01 OBESITY, MORBID (MULTI): ICD-10-CM

## 2024-04-25 DIAGNOSIS — Z79.4 TYPE 2 DIABETES MELLITUS WITHOUT COMPLICATION, WITH LONG-TERM CURRENT USE OF INSULIN (MULTI): ICD-10-CM

## 2024-04-25 DIAGNOSIS — E11.9 TYPE 2 DIABETES MELLITUS WITHOUT COMPLICATION, WITH LONG-TERM CURRENT USE OF INSULIN (MULTI): ICD-10-CM

## 2024-04-25 DIAGNOSIS — E11.9 TYPE 2 DIABETES MELLITUS WITHOUT COMPLICATION, WITHOUT LONG-TERM CURRENT USE OF INSULIN (MULTI): ICD-10-CM

## 2024-04-25 RX ORDER — FLASH GLUCOSE SENSOR
KIT MISCELLANEOUS
Qty: 2 EACH | Refills: 0 | Status: SHIPPED | OUTPATIENT
Start: 2024-04-25 | End: 2024-05-13 | Stop reason: SDUPTHER

## 2024-04-25 NOTE — PROGRESS NOTES
Successful outreach to patient regarding hospitalization. Patient states wound completely healed.    At time of outreach call the patient feels as if their condition has improved  since initial visit with PCP or specialist.    Questions or concerns addressed at this time with patient.   Provided contact information to patient if any further non-emergent needs arise.      Patient encouraged rto call providers for any questions concerns or change in condition

## 2024-04-30 DIAGNOSIS — E11.9 TYPE 2 DIABETES MELLITUS WITHOUT COMPLICATION, WITH LONG-TERM CURRENT USE OF INSULIN (MULTI): ICD-10-CM

## 2024-04-30 DIAGNOSIS — E78.00 HYPERCHOLESTEROLEMIA: ICD-10-CM

## 2024-04-30 DIAGNOSIS — Z79.4 TYPE 2 DIABETES MELLITUS WITHOUT COMPLICATION, WITH LONG-TERM CURRENT USE OF INSULIN (MULTI): ICD-10-CM

## 2024-04-30 DIAGNOSIS — I10 BENIGN HYPERTENSION: ICD-10-CM

## 2024-05-01 RX ORDER — PEN NEEDLE, DIABETIC 30 GX3/16"
NEEDLE, DISPOSABLE MISCELLANEOUS
Qty: 200 EACH | Refills: 0 | Status: SHIPPED | OUTPATIENT
Start: 2024-05-01 | End: 2024-06-10

## 2024-05-01 RX ORDER — LOSARTAN POTASSIUM 50 MG/1
50 TABLET ORAL DAILY
Qty: 90 TABLET | Refills: 0 | Status: SHIPPED | OUTPATIENT
Start: 2024-05-01

## 2024-05-01 RX ORDER — ATORVASTATIN CALCIUM 20 MG/1
20 TABLET, FILM COATED ORAL DAILY
Qty: 90 TABLET | Refills: 0 | Status: SHIPPED | OUTPATIENT
Start: 2024-05-01

## 2024-05-13 DIAGNOSIS — E11.9 TYPE 2 DIABETES MELLITUS WITHOUT COMPLICATION, WITHOUT LONG-TERM CURRENT USE OF INSULIN (MULTI): ICD-10-CM

## 2024-05-13 RX ORDER — FLASH GLUCOSE SENSOR
KIT MISCELLANEOUS
Qty: 6 EACH | Refills: 0 | Status: SHIPPED | OUTPATIENT
Start: 2024-05-13

## 2024-06-07 DIAGNOSIS — Z79.4 TYPE 2 DIABETES MELLITUS WITHOUT COMPLICATION, WITH LONG-TERM CURRENT USE OF INSULIN (MULTI): ICD-10-CM

## 2024-06-07 DIAGNOSIS — E11.9 TYPE 2 DIABETES MELLITUS WITHOUT COMPLICATION, WITH LONG-TERM CURRENT USE OF INSULIN (MULTI): ICD-10-CM

## 2024-06-10 RX ORDER — PEN NEEDLE, DIABETIC 30 GX3/16"
NEEDLE, DISPOSABLE MISCELLANEOUS
Qty: 200 EACH | Refills: 0 | Status: SHIPPED | OUTPATIENT
Start: 2024-06-10

## 2024-07-03 DIAGNOSIS — Z79.4 TYPE 2 DIABETES MELLITUS WITHOUT COMPLICATION, WITH LONG-TERM CURRENT USE OF INSULIN (MULTI): ICD-10-CM

## 2024-07-03 DIAGNOSIS — E11.9 TYPE 2 DIABETES MELLITUS WITHOUT COMPLICATION, WITH LONG-TERM CURRENT USE OF INSULIN (MULTI): ICD-10-CM

## 2024-07-03 RX ORDER — INSULIN DETEMIR 100 [IU]/ML
INJECTION, SOLUTION SUBCUTANEOUS
Qty: 90 ML | Refills: 0 | Status: SHIPPED | OUTPATIENT
Start: 2024-07-03

## 2024-07-08 ENCOUNTER — APPOINTMENT (OUTPATIENT)
Dept: PRIMARY CARE | Facility: CLINIC | Age: 54
End: 2024-07-08
Payer: MEDICARE

## 2024-07-10 DIAGNOSIS — E10.9 TYPE 1 DIABETES MELLITUS WITHOUT COMPLICATION (MULTI): ICD-10-CM

## 2024-07-10 DIAGNOSIS — E11.9 TYPE 2 DIABETES MELLITUS WITHOUT COMPLICATION, WITHOUT LONG-TERM CURRENT USE OF INSULIN (MULTI): ICD-10-CM

## 2024-07-10 RX ORDER — FLASH GLUCOSE SCANNING READER
EACH MISCELLANEOUS
Qty: 1 EACH | Refills: 0 | Status: SHIPPED | OUTPATIENT
Start: 2024-07-10

## 2024-07-10 RX ORDER — FLASH GLUCOSE SENSOR
KIT MISCELLANEOUS
Qty: 6 EACH | Refills: 0 | Status: SHIPPED | OUTPATIENT
Start: 2024-07-10

## 2024-08-01 ENCOUNTER — APPOINTMENT (OUTPATIENT)
Dept: PRIMARY CARE | Facility: CLINIC | Age: 54
End: 2024-08-01
Payer: MEDICARE

## 2024-08-14 DIAGNOSIS — Z79.4 TYPE 2 DIABETES MELLITUS WITHOUT COMPLICATION, WITH LONG-TERM CURRENT USE OF INSULIN (MULTI): ICD-10-CM

## 2024-08-14 DIAGNOSIS — E78.00 HYPERCHOLESTEROLEMIA: ICD-10-CM

## 2024-08-14 DIAGNOSIS — E11.9 TYPE 2 DIABETES MELLITUS WITHOUT COMPLICATION, WITH LONG-TERM CURRENT USE OF INSULIN (MULTI): ICD-10-CM

## 2024-08-14 DIAGNOSIS — I10 BENIGN HYPERTENSION: ICD-10-CM

## 2024-08-14 RX ORDER — LOSARTAN POTASSIUM 50 MG/1
50 TABLET ORAL DAILY
Qty: 90 TABLET | Refills: 0 | Status: SHIPPED | OUTPATIENT
Start: 2024-08-14

## 2024-08-14 RX ORDER — INSULIN DETEMIR 100 [IU]/ML
INJECTION, SOLUTION SUBCUTANEOUS
Qty: 90 ML | Refills: 0 | Status: SHIPPED | OUTPATIENT
Start: 2024-08-14

## 2024-08-14 RX ORDER — ATORVASTATIN CALCIUM 20 MG/1
20 TABLET, FILM COATED ORAL DAILY
Qty: 90 TABLET | Refills: 0 | Status: SHIPPED | OUTPATIENT
Start: 2024-08-14

## 2024-08-20 DIAGNOSIS — K21.9 GASTROESOPHAGEAL REFLUX DISEASE, UNSPECIFIED WHETHER ESOPHAGITIS PRESENT: ICD-10-CM

## 2024-08-20 RX ORDER — OMEPRAZOLE 20 MG/1
CAPSULE, DELAYED RELEASE ORAL
Qty: 60 CAPSULE | Refills: 0 | Status: SHIPPED | OUTPATIENT
Start: 2024-08-20

## 2024-08-22 ENCOUNTER — APPOINTMENT (OUTPATIENT)
Dept: PRIMARY CARE | Facility: CLINIC | Age: 54
End: 2024-08-22
Payer: MEDICARE

## 2024-08-22 VITALS
WEIGHT: 216.6 LBS | RESPIRATION RATE: 18 BRPM | SYSTOLIC BLOOD PRESSURE: 123 MMHG | DIASTOLIC BLOOD PRESSURE: 80 MMHG | HEART RATE: 81 BPM | OXYGEN SATURATION: 96 % | HEIGHT: 60 IN | BODY MASS INDEX: 42.53 KG/M2

## 2024-08-22 DIAGNOSIS — E78.00 HYPERCHOLESTEROLEMIA: ICD-10-CM

## 2024-08-22 DIAGNOSIS — M54.16 LUMBAR RADICULAR PAIN: ICD-10-CM

## 2024-08-22 DIAGNOSIS — M25.561 CHRONIC PAIN OF BOTH KNEES: ICD-10-CM

## 2024-08-22 DIAGNOSIS — E11.59 TYPE 2 DIABETES MELLITUS WITH OTHER CIRCULATORY COMPLICATIONS (MULTI): ICD-10-CM

## 2024-08-22 DIAGNOSIS — Z00.00 ENCOUNTER FOR ANNUAL WELLNESS VISIT (AWV) IN MEDICARE PATIENT: ICD-10-CM

## 2024-08-22 DIAGNOSIS — I10 BENIGN HYPERTENSION: ICD-10-CM

## 2024-08-22 DIAGNOSIS — G89.29 CHRONIC PAIN OF BOTH KNEES: ICD-10-CM

## 2024-08-22 DIAGNOSIS — Z12.11 COLON CANCER SCREENING: ICD-10-CM

## 2024-08-22 DIAGNOSIS — R26.9 ABNORMALITY OF GAIT: ICD-10-CM

## 2024-08-22 DIAGNOSIS — M25.562 CHRONIC PAIN OF BOTH KNEES: ICD-10-CM

## 2024-08-22 DIAGNOSIS — F20.9 SCHIZOPHRENIA, UNSPECIFIED TYPE (MULTI): ICD-10-CM

## 2024-08-22 PROBLEM — E10.9 TYPE 1 DIABETES MELLITUS WITHOUT COMPLICATION (MULTI): Status: RESOLVED | Noted: 2023-09-11 | Resolved: 2024-08-22

## 2024-08-22 PROBLEM — H10.30 ACUTE CONJUNCTIVITIS: Status: RESOLVED | Noted: 2023-10-23 | Resolved: 2024-08-22

## 2024-08-22 LAB — POC HEMOGLOBIN A1C: 7.3 % (ref 4.2–6.5)

## 2024-08-22 ASSESSMENT — PAIN SCALES - GENERAL: PAINLEVEL: 8

## 2024-08-22 ASSESSMENT — ACTIVITIES OF DAILY LIVING (ADL)
TAKING_MEDICATION: INDEPENDENT
DOING_HOUSEWORK: INDEPENDENT
DRESSING: INDEPENDENT
GROCERY_SHOPPING: INDEPENDENT
MANAGING_FINANCES: NEEDS ASSISTANCE
BATHING: INDEPENDENT

## 2024-08-22 ASSESSMENT — ENCOUNTER SYMPTOMS
MYALGIAS: 1
ARTHRALGIAS: 1
WEAKNESS: 1
FATIGUE: 1
BACK PAIN: 1

## 2024-08-22 ASSESSMENT — PATIENT HEALTH QUESTIONNAIRE - PHQ9
2. FEELING DOWN, DEPRESSED OR HOPELESS: NOT AT ALL
1. LITTLE INTEREST OR PLEASURE IN DOING THINGS: NOT AT ALL
SUM OF ALL RESPONSES TO PHQ9 QUESTIONS 1 AND 2: 0

## 2024-08-22 NOTE — ASSESSMENT & PLAN NOTE
- having difficulty walking  - rule out PVD with vascular studies with exercise  - will refer to PT for eval for an electric scooter per pt request

## 2024-08-22 NOTE — PROGRESS NOTES
Patient ID:   Km Tam is a 53 y.o. female with PMH remarkable for chronic back pain, HTN, HLD, DM2, schizophrenia, PTSD who presents to the office today for Follow-up and Medicare Annual Wellness Visit Subsequent.    HEALTH MAINTENANCE: Annual Medicare Wellness Physical  Mammogram (40-75): 3/17/2023 -ve  Pap smear (21-65, or hysterectomy q5yrs): 2017  Last Labs: 3/26/2024  HgbA1c 8.0 on 3/26/2024 -> DUE 7.3  Colonoscopy (45-75 or age 40 with 1st degree relative dx colon ca): will order cologuard  - c/o pain in legs that wake her up from sleep  - unable to do a lot of walking and inquiring about a scooter  - smokes marijuana but not cigarettes    Social History     Tobacco Use    Smoking status: Former     Types: Cigarettes    Smokeless tobacco: Never   Vaping Use    Vaping status: Never Used   Substance Use Topics    Alcohol use: Never    Drug use: Never       Immunization History   Administered Date(s) Administered    Flu vaccine, trivalent, preservative free, age 6 months and greater (Fluarix/Fluzone/Flulaval) 10/17/2011, 08/26/2016    Influenza, Unspecified 08/26/2016    Pfizer Purple Cap SARS-CoV-2 04/09/2021, 04/30/2021       Review of Systems   Constitutional:  Positive for fatigue.   Musculoskeletal:  Positive for arthralgias, back pain, gait problem and myalgias.   Neurological:  Positive for weakness.   All other systems reviewed and are negative.    Allergies   Allergen Reactions    Tramadol Other     Other reaction(s): GI Upset     Visit Vitals  /80   Pulse 81   Resp 18   Ht 1.524 m (5')   Wt 98.2 kg (216 lb 9.6 oz)   SpO2 96%   BMI 42.30 kg/m²   OB Status Premenopausal   Smoking Status Former   BSA 2.04 m²     Physical Exam  Vitals reviewed.   Constitutional:       General: She is not in acute distress.     Appearance: Normal appearance. She is not ill-appearing.   HENT:      Head: Normocephalic and atraumatic.      Right Ear: Tympanic membrane and external ear normal.      Left Ear:  Tympanic membrane and external ear normal.      Nose: Nose normal.      Mouth/Throat:      Mouth: Mucous membranes are moist.      Pharynx: Oropharynx is clear.   Eyes:      Conjunctiva/sclera: Conjunctivae normal.      Pupils: Pupils are equal, round, and reactive to light.   Cardiovascular:      Rate and Rhythm: Normal rate and regular rhythm.      Heart sounds: Normal heart sounds. No murmur heard.  Pulmonary:      Effort: Pulmonary effort is normal. No respiratory distress.      Breath sounds: Normal breath sounds. No wheezing.   Abdominal:      General: There is no distension.      Palpations: Abdomen is soft. There is no mass.      Tenderness: There is no abdominal tenderness.   Musculoskeletal:      Cervical back: Normal range of motion and neck supple.      Right hip: Tenderness present.      Right knee: Decreased range of motion. Tenderness present.      Left knee: Decreased range of motion. Tenderness present.      Comments: Right > left knee pain   Skin:     General: Skin is warm and dry.   Neurological:      General: No focal deficit present.      Mental Status: She is alert and oriented to person, place, and time.      Sensory: No sensory deficit.      Motor: No weakness.      Coordination: Coordination normal.      Gait: Gait normal.   Psychiatric:         Mood and Affect: Mood normal.         Behavior: Behavior normal.       Current Outpatient Medications   Medication Instructions    ALPRAZolam (XANAX) 1 mg, oral, Nightly PRN    atorvastatin (LIPITOR) 20 mg, oral, Daily    celecoxib (CeleBREX) 200 mg capsule TAKE 1 CAPSULE BY MOUTH ONCE DAILY AS NEEDED FOR  MILD  PAIN  (1-3)  FOR  UP  TO  15  DAYS    flash glucose sensor kit (FreeStyle Janneth 2 Sensor) kit USE AS DIRECTED FOR GLUCOSE READINGS    FLUoxetine (PROZAC) 40 mg, oral, Daily    FreeStyle Janneth 2 [place] misc Use as instructed    Levemir FlexPen 100 unit/mL (3 mL) pen INJECT 110 UNITS SUBCUTANEOUSLY ONCE DAILY AT BEDTIME    losartan (COZAAR) 50  "mg, oral, Daily    lurasidone (LATUDA) 60 mg, oral, Daily with evening meal    omeprazole (PriLOSEC) 20 mg DR capsule TAKE 2 CAPSULES BY MOUTH ONCE DAILY. DO NOT CRUSH OR CHEW    ondansetron (ZOFRAN) 4 mg, oral, Every 6 hours during day    pen needle, diabetic (BD Ultra-Fine Short Pen Needle) 31 gauge x 5/16\" needle USE 1 WITH INSULIN PEN TWICE DAILY       Lab Results   Component Value Date    WBC 8.5 07/27/2023    HGB 11.2 (L) 07/27/2023    HCT 38.5 07/27/2023     07/27/2023    CHOL 138 03/02/2023    TRIG 95 03/02/2023    HDL 38.0 (A) 03/02/2023    ALT 13 07/27/2023    AST 13 07/27/2023     07/27/2023    K 3.7 07/27/2023     07/27/2023    CREATININE 0.63 07/27/2023    BUN 10 07/27/2023    CO2 23 07/27/2023    TSH 1.52 07/27/2023    HGBA1C 7.3 (A) 08/22/2024       Problem List Items Addressed This Visit             ICD-10-CM    Abnormality of gait R26.9    Benign hypertension I10     - c/w losartan         Type 2 diabetes mellitus with other circulatory complications (Multi) E11.59     - HgbA1c was 7.3 today (was 8 prev)  - c/w current tx regimen         Relevant Orders    POCT glycosylated hemoglobin (Hb A1C) manually resulted (Completed)    Vascular US PVR with exercise    Hypercholesterolemia E78.00     - c/w statin  - reviewed last lipid panel         Chronic pain of both knees M25.561, M25.562, G89.29     - having difficulty walking  - rule out PVD with vascular studies with exercise  - will refer to PT for eval for an electric scooter per pt request         Relevant Orders    Vascular US PVR with exercise    Referral to Physical Therapy    XR knee 4+ views bilateral    XR thoracolumbar spine 2 views    Schizophrenia (Multi) F20.9     - FU with psych as scheduled  - c/w current tx regimen         Encounter for annual wellness visit (AWV) in Medicare patient Z00.00    Lumbar radicular pain M54.16    Relevant Orders    Vascular US PVR with exercise    Referral to Physical Therapy    XR knee 4+ " views bilateral    XR thoracolumbar spine 2 views     Other Visit Diagnoses         Codes    Colon cancer screening     Z12.11    Relevant Orders    Cologuard® colon cancer screening          --------------------  Written by Ashlee Garcia RN, acting as a scribe for Dr. Lopez. This note accurately reflects the work and decisions made by Dr. Lopez.     I, Dr. Lopez, attest all medical record entries made by the scribe were under my direction and were personally dictated by me. I have reviewed the chart and agree that the record accurately reflects my performance of the history, physical exam, and assessment and plan.

## 2024-08-26 ENCOUNTER — TELEPHONE (OUTPATIENT)
Dept: PRIMARY CARE | Facility: CLINIC | Age: 54
End: 2024-08-26
Payer: MEDICARE

## 2024-08-26 NOTE — TELEPHONE ENCOUNTER
Pt [facility] electric scooter order to send to dme company  Will call back with fax number

## 2024-09-11 DIAGNOSIS — M25.561 PAIN IN BOTH KNEES, UNSPECIFIED CHRONICITY: ICD-10-CM

## 2024-09-11 DIAGNOSIS — M54.50 LUMBAR PAIN: ICD-10-CM

## 2024-09-11 DIAGNOSIS — M25.562 PAIN IN BOTH KNEES, UNSPECIFIED CHRONICITY: ICD-10-CM

## 2024-09-19 ENCOUNTER — APPOINTMENT (OUTPATIENT)
Dept: PHYSICAL THERAPY | Facility: HOSPITAL | Age: 54
End: 2024-09-19
Payer: MEDICARE

## 2024-10-04 ENCOUNTER — TELEPHONE (OUTPATIENT)
Dept: PRIMARY CARE | Facility: CLINIC | Age: 54
End: 2024-10-04
Payer: MEDICARE

## 2024-10-04 DIAGNOSIS — B35.3 TINEA PEDIS, UNSPECIFIED LATERALITY: ICD-10-CM

## 2024-10-04 DIAGNOSIS — Z91.89 AT RISK FOR FOOT PROBLEM: Primary | ICD-10-CM

## 2024-10-04 RX ORDER — CLOTRIMAZOLE AND BETAMETHASONE DIPROPIONATE 10; .64 MG/G; MG/G
1 CREAM TOPICAL 2 TIMES DAILY
Qty: 15 G | Refills: 0 | Status: SHIPPED | OUTPATIENT
Start: 2024-10-04 | End: 2024-11-01

## 2024-10-18 DIAGNOSIS — E11.9 TYPE 2 DIABETES MELLITUS WITHOUT COMPLICATION, WITHOUT LONG-TERM CURRENT USE OF INSULIN (MULTI): ICD-10-CM

## 2024-10-18 RX ORDER — FLASH GLUCOSE SENSOR
KIT MISCELLANEOUS
Qty: 2 EACH | Refills: 0 | Status: SHIPPED | OUTPATIENT
Start: 2024-10-18

## 2024-10-28 ENCOUNTER — APPOINTMENT (OUTPATIENT)
Dept: PHYSICAL THERAPY | Facility: HOSPITAL | Age: 54
End: 2024-10-28
Payer: MEDICARE

## 2024-11-13 DIAGNOSIS — I10 BENIGN HYPERTENSION: ICD-10-CM

## 2024-11-13 DIAGNOSIS — E78.00 HYPERCHOLESTEROLEMIA: ICD-10-CM

## 2024-11-14 RX ORDER — ATORVASTATIN CALCIUM 20 MG/1
20 TABLET, FILM COATED ORAL DAILY
Qty: 90 TABLET | Refills: 0 | Status: SHIPPED | OUTPATIENT
Start: 2024-11-14

## 2024-11-14 RX ORDER — LOSARTAN POTASSIUM 50 MG/1
50 TABLET ORAL DAILY
Qty: 90 TABLET | Refills: 0 | Status: SHIPPED | OUTPATIENT
Start: 2024-11-14

## 2024-11-23 DIAGNOSIS — E11.9 TYPE 2 DIABETES MELLITUS WITHOUT COMPLICATION, WITHOUT LONG-TERM CURRENT USE OF INSULIN (MULTI): ICD-10-CM

## 2024-11-25 RX ORDER — FLASH GLUCOSE SENSOR
KIT MISCELLANEOUS
Qty: 2 EACH | Refills: 0 | Status: SHIPPED | OUTPATIENT
Start: 2024-11-25

## 2024-11-26 ENCOUNTER — APPOINTMENT (OUTPATIENT)
Dept: PRIMARY CARE | Facility: CLINIC | Age: 54
End: 2024-11-26
Payer: MEDICARE

## 2024-12-03 ENCOUNTER — APPOINTMENT (OUTPATIENT)
Dept: PRIMARY CARE | Facility: CLINIC | Age: 54
End: 2024-12-03
Payer: MEDICARE

## 2024-12-19 ENCOUNTER — APPOINTMENT (OUTPATIENT)
Dept: RADIOLOGY | Facility: CLINIC | Age: 54
End: 2024-12-19
Payer: MEDICARE

## 2024-12-26 DIAGNOSIS — E11.9 TYPE 2 DIABETES MELLITUS WITHOUT COMPLICATION, WITHOUT LONG-TERM CURRENT USE OF INSULIN (MULTI): ICD-10-CM

## 2024-12-27 RX ORDER — FLASH GLUCOSE SENSOR
KIT MISCELLANEOUS
Qty: 2 EACH | Refills: 0 | Status: SHIPPED | OUTPATIENT
Start: 2024-12-27

## 2024-12-28 DIAGNOSIS — K21.9 GASTROESOPHAGEAL REFLUX DISEASE, UNSPECIFIED WHETHER ESOPHAGITIS PRESENT: ICD-10-CM

## 2024-12-30 RX ORDER — OMEPRAZOLE 20 MG/1
CAPSULE, DELAYED RELEASE ORAL
Qty: 60 CAPSULE | Refills: 0 | Status: SHIPPED | OUTPATIENT
Start: 2024-12-30

## 2025-01-08 DIAGNOSIS — E11.9 TYPE 2 DIABETES MELLITUS WITHOUT COMPLICATION, WITH LONG-TERM CURRENT USE OF INSULIN (MULTI): ICD-10-CM

## 2025-01-08 DIAGNOSIS — Z79.4 TYPE 2 DIABETES MELLITUS WITHOUT COMPLICATION, WITH LONG-TERM CURRENT USE OF INSULIN (MULTI): ICD-10-CM

## 2025-01-08 RX ORDER — INSULIN DETEMIR 100 [IU]/ML
INJECTION, SOLUTION SUBCUTANEOUS
Qty: 90 ML | Refills: 0 | Status: SHIPPED | OUTPATIENT
Start: 2025-01-08

## 2025-01-13 ENCOUNTER — TELEPHONE (OUTPATIENT)
Dept: PRIMARY CARE | Facility: CLINIC | Age: 55
End: 2025-01-13
Payer: MEDICARE

## 2025-01-13 DIAGNOSIS — B35.1 TOENAIL FUNGUS: Primary | ICD-10-CM

## 2025-01-13 RX ORDER — CLOTRIMAZOLE AND BETAMETHASONE DIPROPIONATE 10; .64 MG/G; MG/G
1 CREAM TOPICAL 2 TIMES DAILY
Qty: 15 G | Refills: 0 | Status: SHIPPED | OUTPATIENT
Start: 2025-01-13 | End: 2025-02-10

## 2025-01-20 DIAGNOSIS — E11.9 TYPE 2 DIABETES MELLITUS WITHOUT COMPLICATION, WITHOUT LONG-TERM CURRENT USE OF INSULIN (MULTI): ICD-10-CM

## 2025-01-20 RX ORDER — FLASH GLUCOSE SENSOR
KIT MISCELLANEOUS
Qty: 2 EACH | Refills: 0 | Status: SHIPPED | OUTPATIENT
Start: 2025-01-20

## 2025-01-22 ENCOUNTER — TELEPHONE (OUTPATIENT)
Dept: PRIMARY CARE | Facility: CLINIC | Age: 55
End: 2025-01-22
Payer: MEDICARE

## 2025-01-28 DIAGNOSIS — E11.9 TYPE 2 DIABETES MELLITUS WITHOUT COMPLICATION, WITHOUT LONG-TERM CURRENT USE OF INSULIN (MULTI): ICD-10-CM

## 2025-01-28 RX ORDER — FLASH GLUCOSE SENSOR
KIT MISCELLANEOUS
Qty: 2 EACH | Refills: 0 | Status: SHIPPED | OUTPATIENT
Start: 2025-01-28

## 2025-01-31 ENCOUNTER — TELEPHONE (OUTPATIENT)
Dept: PRIMARY CARE | Facility: CLINIC | Age: 55
End: 2025-01-31
Payer: MEDICARE

## 2025-01-31 DIAGNOSIS — M54.9 CHRONIC BACK PAIN, UNSPECIFIED BACK LOCATION, UNSPECIFIED BACK PAIN LATERALITY: Primary | ICD-10-CM

## 2025-01-31 DIAGNOSIS — G89.29 CHRONIC BACK PAIN, UNSPECIFIED BACK LOCATION, UNSPECIFIED BACK PAIN LATERALITY: Primary | ICD-10-CM
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
## 2025-02-03 ENCOUNTER — OFFICE VISIT (OUTPATIENT)
Dept: URGENT CARE | Age: 55
End: 2025-02-03
Payer: MEDICARE

## 2025-02-03 VITALS
HEIGHT: 61 IN | HEART RATE: 101 BPM | WEIGHT: 211.2 LBS | DIASTOLIC BLOOD PRESSURE: 71 MMHG | OXYGEN SATURATION: 97 % | BODY MASS INDEX: 39.88 KG/M2 | TEMPERATURE: 97.9 F | RESPIRATION RATE: 16 BRPM | SYSTOLIC BLOOD PRESSURE: 112 MMHG

## 2025-02-03 DIAGNOSIS — I10 BENIGN HYPERTENSION: ICD-10-CM

## 2025-02-03 DIAGNOSIS — E78.00 HYPERCHOLESTEROLEMIA: ICD-10-CM

## 2025-02-03 RX ORDER — ATORVASTATIN CALCIUM 20 MG/1
20 TABLET, FILM COATED ORAL DAILY
Qty: 90 TABLET | Refills: 0 | Status: SHIPPED | OUTPATIENT
Start: 2025-02-03

## 2025-02-03 RX ORDER — LOSARTAN POTASSIUM 50 MG/1
50 TABLET ORAL DAILY
Qty: 90 TABLET | Refills: 0 | Status: SHIPPED | OUTPATIENT
Start: 2025-02-03

## 2025-02-03 NOTE — PROGRESS NOTES
"Subjective   Patient ID: Km Tam is a 54 y.o. female. They present today with a chief complaint of Recurrent Skin Infections (Patient has a boil inside vagina, had a surgery in April of last year for one and this one is new, painful when touched or using the restroom ).    History of Present Illness  HPI    Past Medical History  Allergies as of 02/03/2025 - Reviewed 02/03/2025   Allergen Reaction Noted    Tramadol Other 03/01/2016       (Not in a hospital admission)       Past Medical History:   Diagnosis Date    Acute lower UTI 07/27/2023    Personal history of other diseases of the musculoskeletal system and connective tissue 04/01/2021    History of fibromyalgia    Personal history of other endocrine, nutritional and metabolic disease 04/01/2021    History of diabetes mellitus    Personal history of other mental and behavioral disorders     History of depression       Past Surgical History:   Procedure Laterality Date    INCISION AND DRAINAGE OF WOUND  05/2024    boil    OTHER SURGICAL HISTORY  04/01/2021    Gallbladder surgery    OTHER SURGICAL HISTORY  04/01/2021    Anterior cruciate ligament repair        reports that she has been smoking cigarettes. She has never used smokeless tobacco. She reports that she does not drink alcohol and does not use drugs.    Review of Systems  Review of Systems                               Objective    Vitals:    02/03/25 1352   BP: 112/71   BP Location: Left arm   Patient Position: Sitting   BP Cuff Size: Adult   Pulse: 101   Resp: 16   Temp: 36.6 °C (97.9 °F)   TempSrc: Oral   SpO2: 97%   Weight: 95.8 kg (211 lb 3.2 oz)   Height: 1.549 m (5' 1\")     No LMP recorded. Patient is premenopausal.    Physical Exam    Procedures    Point of Care Test & Imaging Results from this visit  No results found for this visit on 02/03/25.   No results found.    Diagnostic study results (if any) were reviewed by Blair Wynn PA-C.    Assessment/Plan   Allergies, medications, " history, and pertinent labs/EKGs/Imaging reviewed by Blair Wynn PA-C.     Medical Decision Making  ***    Orders and Diagnoses  There are no diagnoses linked to this encounter.    Medical Admin Record      Patient disposition: { Disposition:[id number]}    Electronically signed by Blair Wynn PA-C  2:14 PM

## 2025-02-06 ENCOUNTER — OFFICE VISIT (OUTPATIENT)
Dept: OBSTETRICS AND GYNECOLOGY | Facility: CLINIC | Age: 55
End: 2025-02-06
Payer: MEDICARE

## 2025-02-06 VITALS — SYSTOLIC BLOOD PRESSURE: 134 MMHG | WEIGHT: 212 LBS | BODY MASS INDEX: 40.06 KG/M2 | DIASTOLIC BLOOD PRESSURE: 88 MMHG

## 2025-02-06 DIAGNOSIS — N89.8 VAGINAL ODOR: ICD-10-CM

## 2025-02-06 DIAGNOSIS — N76.4 VULVAR ABSCESS: Primary | ICD-10-CM

## 2025-02-06 DIAGNOSIS — N39.46 MIXED STRESS AND URGE URINARY INCONTINENCE: ICD-10-CM

## 2025-02-06 PROCEDURE — 3079F DIAST BP 80-89 MM HG: CPT | Performed by: NURSE PRACTITIONER

## 2025-02-06 PROCEDURE — 99203 OFFICE O/P NEW LOW 30 MIN: CPT | Performed by: NURSE PRACTITIONER

## 2025-02-06 PROCEDURE — 3075F SYST BP GE 130 - 139MM HG: CPT | Performed by: NURSE PRACTITIONER

## 2025-02-06 PROCEDURE — 4010F ACE/ARB THERAPY RXD/TAKEN: CPT | Performed by: NURSE PRACTITIONER

## 2025-02-06 RX ORDER — SULFAMETHOXAZOLE AND TRIMETHOPRIM 800; 160 MG/1; MG/1
1 TABLET ORAL 2 TIMES DAILY
Qty: 14 TABLET | Refills: 0 | Status: SHIPPED | OUTPATIENT
Start: 2025-02-06 | End: 2025-02-13

## 2025-02-06 ASSESSMENT — ENCOUNTER SYMPTOMS
ALLERGIC/IMMUNOLOGIC NEGATIVE: 1
CARDIOVASCULAR NEGATIVE: 1
CONSTITUTIONAL NEGATIVE: 1
ENDOCRINE NEGATIVE: 1
RESPIRATORY NEGATIVE: 1
GASTROINTESTINAL NEGATIVE: 1
FREQUENCY: 1
EYES NEGATIVE: 1
HEMATOLOGIC/LYMPHATIC NEGATIVE: 1
PSYCHIATRIC NEGATIVE: 1
BACK PAIN: 1
NEUROLOGICAL NEGATIVE: 1

## 2025-02-06 NOTE — PROGRESS NOTES
Chief Complaint    Vaginal Itching        HPI    PT STATES THAT THE BOIL HAS GONE DOWN, BUT NOW HAS VAGINAL ITCHING   Last edited by Nga Urrutia MA on 2025  2:03 PM.         54 y.o.  female who presents with complaints of vaginal symptoms.   She is new with the practice.  Reports symptoms of vulvar boil.   Symptoms began this week.   Did warm baths and started taking aspirin. Area started to spontaneously drain and is mostly resolved now.   Does have some itching in area.  Also reports abnormal vaginal odor. No abnormal vaginal discharge.   No fever today but when abscess first appeared she woke up in the middle of the night with sweats.   H/O abscess of buttocks in March of last year. She was admitted to the hospital and received IV antibiotics. Had surgical I&D and followed up with wound care outpatient.    Issues with urinary incontinence. Has seen UroGYN in the past. Had bladder stimulator placed in 2022. Still having incontinence and has to wear adult depends. Symptoms concerning and distressing for her. She last saw UroGYN in 2022.  She is not sexually active.  She is a smoker.   PMH: Diabetes, hyperlipidemia, hypertension, OCD, schizophrenia, PTSD, depression, obesity BMI 40    /88   Wt 96.2 kg (212 lb)   BMI 40.06 kg/m²      Current Outpatient Medications   Medication Instructions    ALPRAZolam (XANAX) 1 mg, Nightly PRN    atorvastatin (LIPITOR) 20 mg, oral, Daily    celecoxib (CeleBREX) 200 mg capsule TAKE 1 CAPSULE BY MOUTH ONCE DAILY AS NEEDED FOR  MILD  PAIN  (1-3)  FOR  UP  TO  15  DAYS    clotrimazole-betamethasone (Lotrisone) cream 1 Application, Topical, 2 times daily    FLUoxetine (PROZAC) 40 mg, Daily    FreeStyle Janneth 2 [employer] misc Use as instructed    FreeStyle Janneth 2 Sensor kit USE AS DIRECTED    insulin detemir (Levemir FlexPen) 100 unit/mL (3 mL) pen Inject 110 units once daily    Levemir FlexPen 100 unit/mL (3 mL) pen INJECT 110 UNITS SUBCUTANEOUSLY ONCE DAILY  "AT BEDTIME    losartan (COZAAR) 50 mg, oral, Daily    lurasidone (LATUDA) 60 mg, Daily with evening meal    omeprazole (PriLOSEC) 20 mg DR capsule TAKE 2 CAPSULES BY MOUTH ONCE DAILY. DO NOT CRUSH OR CHEW    ondansetron (ZOFRAN) 4 mg, oral, Every 6 hours during day    pen needle, diabetic (BD Ultra-Fine Short Pen Needle) 31 gauge x 5/16\" needle USE 1 WITH INSULIN PEN TWICE DAILY    topiramate (TOPAMAX) 25 mg, Daily        Review of Systems   Constitutional: Negative.    HENT: Negative.     Eyes: Negative.    Respiratory: Negative.     Cardiovascular: Negative.    Gastrointestinal: Negative.    Endocrine: Negative.    Genitourinary:  Positive for frequency, genital sores and urgency.        +Incontinence   Musculoskeletal:  Positive for back pain.   Skin: Negative.    Allergic/Immunologic: Negative.    Neurological: Negative.    Hematological: Negative.    Psychiatric/Behavioral: Negative.     All other systems reviewed and are negative.       Physical Exam  Constitutional:       Appearance: Normal appearance.   HENT:      Head: Normocephalic.      Nose: Nose normal.   Pulmonary:      Effort: Pulmonary effort is normal.   Genitourinary:     Vagina: Normal.      Cervix: Normal.          Comments: Sebaceous cyst of left labia  Below this there is an area of induration, swelling with a small open area in center where abscess has rupture and drained. No active drainage on exam. No significant erythema.   Musculoskeletal:         General: Normal range of motion.      Cervical back: Normal range of motion and neck supple.   Neurological:      Mental Status: She is alert.   Psychiatric:         Mood and Affect: Mood normal.         Behavior: Behavior normal.          Assessment/Plan:  1. Vulvar abscess (Primary)  -Spontaneous rupture of abscess with improvement in symptoms.  -Rx sent:  - sulfamethoxazole-trimethoprim (Bactrim DS) 800-160 mg tablet; Take 1 tablet by mouth 2 times a day for 7 days.  Dispense: 14 tablet; " Refill: 0  -Counseled and encouraged smoking cessation.  -Vulvar skin care patient information provided.   -Advised to return for well exam/pap and as needed.     2. Vaginal odor  -Collected:  - Vaginitis Gram Stain For Bacterial Vaginosis + Yeast   -Will notify of results.    3. Mixed stress and urge urinary incontinence  -Advised to follow up with her UroGYN provider.

## 2025-02-07 DIAGNOSIS — E11.59 TYPE 2 DIABETES MELLITUS WITH OTHER CIRCULATORY COMPLICATIONS: ICD-10-CM

## 2025-02-07 LAB — BV SCORE VAG QL: NORMAL

## 2025-02-07 RX ORDER — INSULIN GLARGINE 100 [IU]/ML
INJECTION, SOLUTION SUBCUTANEOUS
Qty: 3 ML | Refills: 1 | Status: SHIPPED | OUTPATIENT
Start: 2025-02-07 | End: 2025-02-10 | Stop reason: SDUPTHER

## 2025-02-10 DIAGNOSIS — E11.59 TYPE 2 DIABETES MELLITUS WITH OTHER CIRCULATORY COMPLICATIONS: ICD-10-CM

## 2025-02-10 RX ORDER — INSULIN GLARGINE 100 [IU]/ML
INJECTION, SOLUTION SUBCUTANEOUS
Qty: 15 ML | Refills: 1 | Status: SHIPPED | OUTPATIENT
Start: 2025-02-10 | End: 2025-02-11 | Stop reason: SDUPTHER

## 2025-02-11 ENCOUNTER — TELEPHONE (OUTPATIENT)
Dept: PRIMARY CARE | Facility: CLINIC | Age: 55
End: 2025-02-11

## 2025-02-11 ENCOUNTER — APPOINTMENT (OUTPATIENT)
Dept: OBSTETRICS AND GYNECOLOGY | Facility: CLINIC | Age: 55
End: 2025-02-11
Payer: MEDICARE

## 2025-02-11 VITALS
DIASTOLIC BLOOD PRESSURE: 70 MMHG | HEIGHT: 61 IN | SYSTOLIC BLOOD PRESSURE: 116 MMHG | WEIGHT: 211 LBS | BODY MASS INDEX: 39.84 KG/M2

## 2025-02-11 DIAGNOSIS — L30.4 INTERTRIGO: ICD-10-CM

## 2025-02-11 DIAGNOSIS — E11.59 TYPE 2 DIABETES MELLITUS WITH OTHER CIRCULATORY COMPLICATIONS: ICD-10-CM

## 2025-02-11 DIAGNOSIS — Z12.4 CERVICAL CANCER SCREENING: ICD-10-CM

## 2025-02-11 DIAGNOSIS — E10.9 TYPE 1 DIABETES MELLITUS WITHOUT COMPLICATION: Primary | ICD-10-CM

## 2025-02-11 DIAGNOSIS — Z01.419 WELL WOMAN EXAM WITH ROUTINE GYNECOLOGICAL EXAM: Primary | ICD-10-CM

## 2025-02-11 PROCEDURE — 88175 CYTOPATH C/V AUTO FLUID REDO: CPT

## 2025-02-11 PROCEDURE — 99396 PREV VISIT EST AGE 40-64: CPT | Performed by: NURSE PRACTITIONER

## 2025-02-11 PROCEDURE — 3078F DIAST BP <80 MM HG: CPT | Performed by: NURSE PRACTITIONER

## 2025-02-11 PROCEDURE — 3074F SYST BP LT 130 MM HG: CPT | Performed by: NURSE PRACTITIONER

## 2025-02-11 PROCEDURE — 87624 HPV HI-RISK TYP POOLED RSLT: CPT

## 2025-02-11 PROCEDURE — 3008F BODY MASS INDEX DOCD: CPT | Performed by: NURSE PRACTITIONER

## 2025-02-11 PROCEDURE — 4010F ACE/ARB THERAPY RXD/TAKEN: CPT | Performed by: NURSE PRACTITIONER

## 2025-02-11 RX ORDER — NYSTATIN 100000 [USP'U]/G
POWDER TOPICAL
Qty: 30 G | Refills: 1 | Status: SHIPPED | OUTPATIENT
Start: 2025-02-11

## 2025-02-11 RX ORDER — AMOXICILLIN AND CLAVULANATE POTASSIUM 875; 125 MG/1; MG/1
TABLET, FILM COATED ORAL
COMMUNITY
Start: 2024-03-27

## 2025-02-11 RX ORDER — INSULIN GLARGINE 100 [IU]/ML
INJECTION, SOLUTION SUBCUTANEOUS
Qty: 15 ML | Refills: 1 | Status: SHIPPED | OUTPATIENT
Start: 2025-02-11

## 2025-02-11 RX ORDER — INSULIN LISPRO 100 [IU]/ML
INJECTION, SOLUTION INTRAVENOUS; SUBCUTANEOUS
Qty: 15 ML | Refills: 0 | Status: SHIPPED | OUTPATIENT
Start: 2025-02-11

## 2025-02-11 ASSESSMENT — ENCOUNTER SYMPTOMS
CARDIOVASCULAR NEGATIVE: 1
ALLERGIC/IMMUNOLOGIC NEGATIVE: 1
HEMATOLOGIC/LYMPHATIC NEGATIVE: 1
MUSCULOSKELETAL NEGATIVE: 1
EYES NEGATIVE: 1
NEUROLOGICAL NEGATIVE: 1
GASTROINTESTINAL NEGATIVE: 1
PSYCHIATRIC NEGATIVE: 1
FREQUENCY: 1
ENDOCRINE NEGATIVE: 1
RESPIRATORY NEGATIVE: 1
CONSTITUTIONAL NEGATIVE: 1

## 2025-02-11 NOTE — TELEPHONE ENCOUNTER
Insurance exceeds 100u daily  Pt on lantus  110u daily     8/2024 A1c 7.3  Orders entered for new A1c in December but pt didn't have it drawn

## 2025-02-11 NOTE — PROGRESS NOTES
"Chief Complaint    Annual Exam        HPI    ANNUAL    PAP 10/3/2017 NEG HPV NEG  MAMM   DEXA NEVER  COLON NEVER    PT STATES THAT SHE IS TAKING HER ANTIBIOTIC AND SHE WILL BE DONE WITH IT BY THE END OF THE WEEK  Last edited by Nga Urrutia MA on 2025  9:57 AM.         54 y.o.  female presents for annual well woman exam.   Patient is post-menopausal, menopause reached at age 48.  Denies significant menopause symptoms. She is not on hormone replacement therapy.  Denies post-menopausal bleeding.  She is not sexually active.  She denies any breast concerns. Mammogram is scheduled this week.   Pap hx. normal. Last pap:   Denies pelvic pain.  Reports abnormal vaginal odor. Recently seen in office on  for vulvar abscess. Currently on antibiotics, and area has improved. Negative BV culture at that time.   Issues with urinary incontinence. Has seen UroGYN in the past. Had bladder stimulator placed in 2022. Still having incontinence and has to wear adult depends. Symptoms concerning and distressing for her. She last saw UroGYN in 2022.   Denies bowel concerns. Colonoscopy: Never, has order for Cologuard.   She is a smoker.   PMH: Diabetes, hyperlipidemia, hypertension, OCD, schizophrenia, PTSD, depression, obesity BMI 40  Family hx is unknown, patient adopted.     /70   Ht 1.549 m (5' 1\")   Wt 95.7 kg (211 lb)   BMI 39.87 kg/m²      Current Outpatient Medications   Medication Instructions    ALPRAZolam (XANAX) 1 mg, Nightly PRN    amoxicillin-pot clavulanate (Augmentin) 875-125 mg tablet     atorvastatin (LIPITOR) 20 mg, oral, Daily    celecoxib (CeleBREX) 200 mg capsule TAKE 1 CAPSULE BY MOUTH ONCE DAILY AS NEEDED FOR  MILD  PAIN  (1-3)  FOR  UP  TO  15  DAYS    FLUoxetine (PROZAC) 40 mg, Daily    FreeStyle Janneth 2 [place] misc Use as instructed    FreeStyle Janneth 2 Sensor kit USE AS DIRECTED    insulin glargine (Lantus Solostar U-100 Insulin) 100 unit/mL (3 mL) pen Inject 110 " "units once dailyInject 110 units once daily    losartan (COZAAR) 50 mg, oral, Daily    lurasidone (LATUDA) 60 mg, Daily with evening meal    omeprazole (PriLOSEC) 20 mg DR capsule TAKE 2 CAPSULES BY MOUTH ONCE DAILY. DO NOT CRUSH OR CHEW    ondansetron (ZOFRAN) 4 mg, oral, Every 6 hours during day    pen needle, diabetic (BD Ultra-Fine Short Pen Needle) 31 gauge x 5/16\" needle USE 1 WITH INSULIN PEN TWICE DAILY    sulfamethoxazole-trimethoprim (Bactrim DS) 800-160 mg tablet 1 tablet, oral, 2 times daily    topiramate (TOPAMAX) 25 mg, Daily        Review of Systems   Constitutional: Negative.    HENT: Negative.     Eyes: Negative.    Respiratory: Negative.     Cardiovascular: Negative.    Gastrointestinal: Negative.    Endocrine: Negative.    Genitourinary:  Positive for frequency and urgency.   Musculoskeletal: Negative.    Skin: Negative.    Allergic/Immunologic: Negative.    Neurological: Negative.    Hematological: Negative.    Psychiatric/Behavioral: Negative.     All other systems reviewed and are negative.       Physical Exam  Constitutional:       Appearance: Normal appearance.   HENT:      Head: Normocephalic.      Nose: Nose normal.   Cardiovascular:      Rate and Rhythm: Normal rate and regular rhythm.   Pulmonary:      Effort: Pulmonary effort is normal.      Breath sounds: Normal breath sounds.   Chest:   Breasts:     Right: Normal.      Left: Normal.   Abdominal:      General: Abdomen is flat.      Palpations: Abdomen is soft.   Genitourinary:     General: Normal vulva.      Vagina: Normal.      Cervix: Normal.      Uterus: Normal.       Adnexa: Right adnexa normal and left adnexa normal.      Rectum: Normal.      Comments: Pap collected  Bimanual limited d/t habitus  Sebaceous cysts left labia  Resolution of left vulvar abscess   Musculoskeletal:         General: Normal range of motion.      Cervical back: Normal range of motion and neck supple.   Skin:     General: Skin is warm and dry.      Comments: " Rash under skin folds of breasts, groin   Neurological:      Mental Status: She is alert.   Psychiatric:         Mood and Affect: Mood normal.         Behavior: Behavior normal.          Assessment/Plan:   1. Well woman exam with routine gynecological exam (Primary)  -Pap test w/HPV testing collected today.   -Mammogram is scheduled. Self breast awareness exams reviewed.  -Colonoscopy advised, she has order for Cologuard.   -Advised yearly well woman exams.   -Follow up sooner if needed.     2. Cervical cancer screening  - THINPREP PAP TEST    3. Intertrigo  -Rx sent:  - nystatin (Mycostatin) 100,000 unit/gram powder; Apply topically to affected area three times a day as needed  Dispense: 30 g; Refill: 1

## 2025-02-13 ENCOUNTER — APPOINTMENT (OUTPATIENT)
Dept: RADIOLOGY | Facility: HOSPITAL | Age: 55
End: 2025-02-13
Payer: MEDICARE

## 2025-02-13 ENCOUNTER — APPOINTMENT (OUTPATIENT)
Facility: CLINIC | Age: 55
End: 2025-02-13
Payer: MEDICARE

## 2025-02-20 ENCOUNTER — HOSPITAL ENCOUNTER (OUTPATIENT)
Dept: RADIOLOGY | Facility: HOSPITAL | Age: 55
Discharge: HOME | End: 2025-02-20
Payer: MEDICARE

## 2025-02-20 ENCOUNTER — APPOINTMENT (OUTPATIENT)
Facility: CLINIC | Age: 55
End: 2025-02-20
Payer: MEDICARE

## 2025-02-20 VITALS
OXYGEN SATURATION: 96 % | HEART RATE: 75 BPM | BODY MASS INDEX: 40.14 KG/M2 | HEIGHT: 61 IN | WEIGHT: 212.6 LBS | RESPIRATION RATE: 20 BRPM | SYSTOLIC BLOOD PRESSURE: 100 MMHG | TEMPERATURE: 98.4 F | DIASTOLIC BLOOD PRESSURE: 71 MMHG

## 2025-02-20 VITALS — BODY MASS INDEX: 40.02 KG/M2 | HEIGHT: 61 IN | WEIGHT: 212 LBS

## 2025-02-20 DIAGNOSIS — M79.7 FIBROMYALGIA: Primary | ICD-10-CM

## 2025-02-20 DIAGNOSIS — G89.29 CHRONIC BACK PAIN, UNSPECIFIED BACK LOCATION, UNSPECIFIED BACK PAIN LATERALITY: ICD-10-CM

## 2025-02-20 DIAGNOSIS — M54.9 CHRONIC BACK PAIN, UNSPECIFIED BACK LOCATION, UNSPECIFIED BACK PAIN LATERALITY: ICD-10-CM

## 2025-02-20 DIAGNOSIS — Z12.31 VISIT FOR SCREENING MAMMOGRAM: ICD-10-CM

## 2025-02-20 DIAGNOSIS — M54.16 LUMBAR RADICULAR PAIN: ICD-10-CM

## 2025-02-20 LAB
CYTOLOGY CMNT CVX/VAG CYTO-IMP: NORMAL
HPV HR 12 DNA GENITAL QL NAA+PROBE: NEGATIVE
HPV HR GENOTYPES PNL CVX NAA+PROBE: NEGATIVE
HPV16 DNA SPEC QL NAA+PROBE: NEGATIVE
HPV18 DNA SPEC QL NAA+PROBE: NEGATIVE
LAB AP HPV GENOTYPE QUESTION: YES
LAB AP HPV HR: NORMAL
LABORATORY COMMENT REPORT: NORMAL
PATH REPORT.TOTAL CANCER: NORMAL

## 2025-02-20 PROCEDURE — 77067 SCR MAMMO BI INCL CAD: CPT

## 2025-02-20 PROCEDURE — 77067 SCR MAMMO BI INCL CAD: CPT | Performed by: STUDENT IN AN ORGANIZED HEALTH CARE EDUCATION/TRAINING PROGRAM

## 2025-02-20 PROCEDURE — 77063 BREAST TOMOSYNTHESIS BI: CPT | Performed by: STUDENT IN AN ORGANIZED HEALTH CARE EDUCATION/TRAINING PROGRAM

## 2025-02-20 PROCEDURE — 3078F DIAST BP <80 MM HG: CPT | Performed by: STUDENT IN AN ORGANIZED HEALTH CARE EDUCATION/TRAINING PROGRAM

## 2025-02-20 PROCEDURE — 3074F SYST BP LT 130 MM HG: CPT | Performed by: STUDENT IN AN ORGANIZED HEALTH CARE EDUCATION/TRAINING PROGRAM

## 2025-02-20 PROCEDURE — 4010F ACE/ARB THERAPY RXD/TAKEN: CPT | Performed by: STUDENT IN AN ORGANIZED HEALTH CARE EDUCATION/TRAINING PROGRAM

## 2025-02-20 PROCEDURE — 3008F BODY MASS INDEX DOCD: CPT | Performed by: STUDENT IN AN ORGANIZED HEALTH CARE EDUCATION/TRAINING PROGRAM

## 2025-02-20 PROCEDURE — 99204 OFFICE O/P NEW MOD 45 MIN: CPT | Performed by: STUDENT IN AN ORGANIZED HEALTH CARE EDUCATION/TRAINING PROGRAM

## 2025-02-20 RX ORDER — GABAPENTIN 300 MG/1
300 CAPSULE ORAL 3 TIMES DAILY
Qty: 180 CAPSULE | Refills: 2 | Status: SHIPPED | OUTPATIENT
Start: 2025-02-20 | End: 2025-08-19

## 2025-02-20 ASSESSMENT — PATIENT HEALTH QUESTIONNAIRE - PHQ9
1. LITTLE INTEREST OR PLEASURE IN DOING THINGS: NOT AT ALL
SUM OF ALL RESPONSES TO PHQ9 QUESTIONS 1 AND 2: 0
2. FEELING DOWN, DEPRESSED OR HOPELESS: NOT AT ALL

## 2025-02-20 ASSESSMENT — COLUMBIA-SUICIDE SEVERITY RATING SCALE - C-SSRS
1. IN THE PAST MONTH, HAVE YOU WISHED YOU WERE DEAD OR WISHED YOU COULD GO TO SLEEP AND NOT WAKE UP?: NO
6. HAVE YOU EVER DONE ANYTHING, STARTED TO DO ANYTHING, OR PREPARED TO DO ANYTHING TO END YOUR LIFE?: NO
2. HAVE YOU ACTUALLY HAD ANY THOUGHTS OF KILLING YOURSELF?: NO

## 2025-02-20 ASSESSMENT — ENCOUNTER SYMPTOMS: BACK PAIN: 1

## 2025-02-20 ASSESSMENT — PAIN SCALES - GENERAL: PAINLEVEL_OUTOF10: 8
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
## 2025-02-23 ASSESSMENT — ENCOUNTER SYMPTOMS
SWEATS: 0
TREMORS: 0
HEADACHES: 0
CONFUSION: 0
BLACKOUTS: 0
WEIGHT LOSS: 0
SEIZURES: 0
FATIGUE: 1
BLURRED VISION: 0
HUNGER: 0
DIZZINESS: 1
NERVOUS/ANXIOUS: 1
WEAKNESS: 0
POLYDIPSIA: 0
VISUAL CHANGE: 0
POLYPHAGIA: 0
SPEECH DIFFICULTY: 0

## 2025-02-24 DIAGNOSIS — N64.89 BREAST ASYMMETRY: Primary | ICD-10-CM

## 2025-02-26 NOTE — PATIENT INSTRUCTIONS
It was nice to see you in the office today!  As discussed during our visit...    Your HgbA1C was 7.0 today.    Please complete the lab orders and Cologuard order that have been placed.     Continue the same medications.  Your chronic conditions appear stable.  Call the office with any questions or concerns [phone]  Follow up in 4 months or sooner if needed.        Please have your 12 hour FASTING blood drawn in the next couple weeks.    You do NOT need an appointment for lab work and/or Xray's but now that [facility] was bought out by [facility], an appointment for lab work is RECOMMENDED / encouraged.    Do not get lab work done while you are on steroids (prednisone, medrol dose pack, dexamethasone) unless instructed differently by Dr Lopez because this can cause some labs to be off.  Hold Biotin, B vitamins, B Complex for 2-3 days before any blood draw (this can cause false thyroid function tests)  During the 12 hour FASTING time, you may have BLACK coffee, BLACK tea and/or water at any time.    The two nearest Outpatient Lab's to THIS office is:  [facility] (this is IN the Urgent Care building by [employer])  [address]  [place] [address] [phone]    Link to Schedule an Appointment:  [email or web address]    Hours:   Monday through Friday 7:30am - 4:30pm  CLOSED Saturday and Sunday    Or you can go to ...  [facility]  [address]  [place] [address]  [phone]    Link to Schedule an Appointment:  [email or web address]    Hours:   Monday through Friday 7am - 12:30pm, 1pm - 4:30pm  CLOSED Saturday and Sunday     Click the blue link to take you to the website to confirm hours/location Location Search for  Labwork  [email or web address]    We will contact you with the results of your blood work (once they have ALL finalized & Dr Lopez has reviewed them) and let you know of any necessary adjustments need to be done to your plan of care.    If you do not hear from us within 3-5 days business days of having your blood drawn, please call the [place] office at [phone].   -----------  The  [employer] offers affordable laboratory tests.   Financial assistance is also available to those who meet financial eligibility requirements by submitting an [facility] or calling, [phone].   Click this link to Get a Price Estimate Today on what lab work may cost you.    Patient is an active smoker and patient has been discussed that continued smoking increase the risk of lung cancer, Chronic bronchitis, COPD, Aortic aneurysm, Stenosis of artery, Heart attack, CAD, etc. Continued smoking increase the risk of several other diseases.    There are some medications available if you wish to try them to help in cessation of smoking and I am happy to prescribe.   If you have any questions than you are welcome to discuss it with me.      Quitting Smoking    Quitting smoking is the most important step you can take to improve your health. We're glad you have set a goal to improve your health.    Quit Smoking Resources    In addition to medications, use the STAR plan to help you successfully quit.   Stick with your quit date!   Tell friends, family, and coworkers your quit date. Request their understanding and support.  Anticipate and prepare for challenges. Some examples are withdrawal symptoms, being around others who smoke, and drinking alcohol.  Remove all tobacco products and paraphernalia from your environment. Make your home and vehicles smoke-free.    Free resources for additional support:  National tobacco quitline: 1-800-QUIT-NOW (1-620.385.8866).  SmokefreeTXT is a free  text program to assist you in quitting. Visit https://www.smokefree.gov/smokefreetxt for more information.  Feel free to call your care manager at ([phone]) for additional support.            Ways to Help Prevent Falls at Home    Quick Tips   ? Ask for help if you need it. Most people want to help!   ? Get up slowly after sitting or laying down   ? Wear a medical alert device or keep cell phone in your pocket   ? Use night lights, especially areas near a bathroom   ? Keep the items you use often within reach on a small stool or end table   ? Use an assistive device such as walker or cane, as directed by provider/physical therapy   ? Use a non-slip mat and grab bars in your bathroom. Look for home health sections for best options     Other Areas to Focus On   ? Exercise and nutrition: Regular exercise or taking a falls prevention class are great ways improve strength and balance. Don’t forget to stay hydrated and bring a snack!   ? Medicine side effects: Some medicines can make you sleepy or dizzy, which could cause a fall. Ask your healthcare provider about the side effects your medicines could cause. Be sure to let them know if you take any vitamins or supplements as well.   ? Tripping hazards: Remove items you could trip on, such as loose mats, rugs, cords, and clutter. Wear closed toe shoes with rubber soles.   ? Health and wellness: Get regular checkups with your healthcare provider, plus routine vision and hearing screenings. Talk with your healthcare provider about:   o Your medicines and the possible side effects - bring them in a bag if that is easier!   o Problems with balance or feeling dizzy   o Ways to promote bone health, such as Vitamin D and calcium supplements   o Questions or concerns about falling     *Ask your healthcare team if you have questions     [facility], 2022         Ways to Help Prevent Falls at Home    Quick Tips   ? Ask for help if you need it. Most people want to help!    ? Get up slowly after sitting or laying down   ? Wear a medical alert device or keep cell phone in your pocket   ? Use night lights, especially areas near a bathroom   ? Keep the items you use often within reach on a small stool or end table   ? Use an assistive device such as walker or cane, as directed by provider/physical therapy   ? Use a non-slip mat and grab bars in your bathroom. Look for home health sections for best options     Other Areas to Focus On   ? Exercise and nutrition: Regular exercise or taking a falls prevention class are great ways improve strength and balance. Don’t forget to stay hydrated and bring a snack!   ? Medicine side effects: Some medicines can make you sleepy or dizzy, which could cause a fall. Ask your healthcare provider about the side effects your medicines could cause. Be sure to let them know if you take any vitamins or supplements as well.   ? Tripping hazards: Remove items you could trip on, such as loose mats, rugs, cords, and clutter. Wear closed toe shoes with rubber soles.   ? Health and wellness: Get regular checkups with your healthcare provider, plus routine vision and hearing screenings. Talk with your healthcare provider about:   o Your medicines and the possible side effects - bring them in a bag if that is easier!   o Problems with balance or feeling dizzy   o Ways to promote bone health, such as Vitamin D and calcium supplements   o Questions or concerns about falling     *Ask your healthcare team if you have questions     [facility], 2022         Ways to Help Prevent Falls at Home    Quick Tips   ? Ask for help if you need it. Most people want to help!   ? Get up slowly after sitting or laying down   ? Wear a medical alert device or keep cell phone in your pocket   ? Use night lights, especially areas near a bathroom   ? Keep the items you use often within reach on a small stool or end table   ? Use an assistive device such as walker or cane, as  directed by provider/physical therapy   ? Use a non-slip mat and grab bars in your bathroom. Look for home health sections for best options     Other Areas to Focus On   ? Exercise and nutrition: Regular exercise or taking a falls prevention class are great ways improve strength and balance. Don’t forget to stay hydrated and bring a snack!   ? Medicine side effects: Some medicines can make you sleepy or dizzy, which could cause a fall. Ask your healthcare provider about the side effects your medicines could cause. Be sure to let them know if you take any vitamins or supplements as well.   ? Tripping hazards: Remove items you could trip on, such as loose mats, rugs, cords, and clutter. Wear closed toe shoes with rubber soles.   ? Health and wellness: Get regular checkups with your healthcare provider, plus routine vision and hearing screenings. Talk with your healthcare provider about:   o Your medicines and the possible side effects - bring them in a bag if that is easier!   o Problems with balance or feeling dizzy   o Ways to promote bone health, such as Vitamin D and calcium supplements   o Questions or concerns about falling     *Ask your healthcare team if you have questions     ©[facility], 2022

## 2025-02-26 NOTE — PROGRESS NOTES
Patient ID:   Km Tam is a 54 y.o. female with PMH remarkable for chronic back pain, HTN, HLD, DM1, schizophrenia, PTSD who presents to the office today for Follow-up.    HEALTH MAINTENANCE: Annual Wellness Physical  Last Office Visit: 8/22/24- Physical - labs and cologuard orders placed   Mammogram (40-75): 2/20/25-Right breast asymmetry to do ultrasound of the right breast Scheduled on 3/3/25  Pap smear (21-65, or hysterectomy q5yrs): 2/11/25 negative  Last Labs: 3/26/24- active orders from last visit not done  HgbA1C: 8/22/24- 7.3 DUE Today  Colonoscopy (45-75 or age 40 with 1st degree relative dx colon ca):  Cologuard ordered not done   Lung cancer screening (50-78 y/o x 20 pk yr for at least 20 yrs + current smoker OR quit in last 15 years, no CT w/I last year):   DEXA (65+, q 2 years): NA     She states that she is doing. She states that she gets nervous when she has to come to the doctor because she thinks that there is going to be bad news. She states that she has been having a issue with her blood sugar dropping in the 70's at night. She states that she is not always hungry during the day so she will skip meals, she takes all her medicine, including insulins at 7pm every night. She states that she stays up until 4am and wakes up at 7am every day. States that she listens to music, watches tv, cleans, or plays with her animals , She states that sometimes she will snack during the night when she is up. She states that she takes about a 2 hour mid day. States that she has had this sleep routine for some time now. She denies any issues with her bowel or bladder, denies shortness of breath or chest pain. She states that she is going to try to quit smoking again because she has to have her teeth pulled.      Tobacco Counseling  The patient smokes cigarettes and desires to quit.  We created the following quit plan:  Quit assistance referrals: gave quit line number (7-883-QUIT-NOW) and gave information on  Smokefree.gov.    Patient was identified as a fall risk. Risk prevention instructions provided.  Past Medical History:   Diagnosis Date    Acute lower UTI 07/27/2023    Allergic 2000    Anxiety     Arthritis 2000    Benign hypertension     Chronic pain disorder 2014    Depression 2014    Diabetes mellitus type 1 (Multi)     Eczema 2014    Fibromyalgia, primary 2018    GERD (gastroesophageal reflux disease)     Glaucoma 2024    Hypercholesteremia     Joint pain 2014    Neck pain 2023    Personal history of other diseases of the musculoskeletal system and connective tissue 04/01/2021    History of fibromyalgia    Personal history of other endocrine, nutritional and metabolic disease 04/01/2021    History of diabetes mellitus    Personal history of other mental and behavioral disorders     History of depression      Past Surgical History:   Procedure Laterality Date    CHOLECYSTECTOMY  1989    INCISION AND DRAINAGE OF WOUND  05/2024    boil    MULTIPLE TOOTH EXTRACTIONS  2024    top teeth    OTHER SURGICAL HISTORY  04/01/2021    Gallbladder surgery    OTHER SURGICAL HISTORY  04/01/2021    Anterior cruciate ligament repair    TUBAL LIGATION        Social History     Tobacco Use    Smoking status: Every Day     Current packs/day: 1.00     Average packs/day: 1 pack/day for 1 year (1.0 ttl pk-yrs)     Types: Cigarettes     Start date: 10/1/2024    Smokeless tobacco: Never    Tobacco comments:     I started smoking when I was 22. I quit when I had my son in 1999, then i started up 2024   Vaping Use    Vaping status: Never Used   Substance Use Topics    Alcohol use: Never    Drug use: Yes     Frequency: 9.0 times per week     Types: Marijuana     Comment: medical marijuana     Family History   Adopted: Yes      Immunization History   Administered Date(s) Administered    COVID-19, mRNA, LNP-S, PF, 30 mcg/0.3 mL dose 04/09/2021, 04/30/2021    Flu vaccine, trivalent, preservative free, age 6 months and greater  "(Fluarix/Fluzone/Flulaval) 10/17/2011, 08/26/2016    Influenza, Unspecified 08/26/2016     Review of Systems   Constitutional: Negative.    HENT: Negative.     Eyes: Negative.    Respiratory: Negative.     Cardiovascular: Negative.    Gastrointestinal: Negative.    Endocrine: Positive for polyuria.   Genitourinary: Negative.    Musculoskeletal: Negative.    Skin: Negative.    Neurological: Negative.    Psychiatric/Behavioral: Negative.     All other systems reviewed and are negative.    Allergies   Allergen Reactions    Tramadol Other     Other reaction(s): GI Upset     Visit Vitals  /86 (BP Location: Right arm, Patient Position: Sitting)   Pulse 74   Resp 18   Ht 1.549 m (5' 1\")   Wt 97.1 kg (214 lb)   SpO2 97%   BMI 40.43 kg/m²   OB Status Premenopausal   Smoking Status Every Day   BSA 2.04 m²     Physical Exam  Vitals and nursing note reviewed. Exam conducted with a chaperone present.   Constitutional:       Appearance: Normal appearance. She is well-developed.   HENT:      Head: Normocephalic.      Right Ear: External ear normal.      Left Ear: External ear normal.      Nose: Nose normal.      Mouth/Throat:      Lips: Pink.      Mouth: Mucous membranes are moist.   Eyes:      General: Lids are normal.      Pupils: Pupils are equal, round, and reactive to light.   Neck:      Trachea: Trachea normal.   Cardiovascular:      Rate and Rhythm: Normal rate and regular rhythm.      Heart sounds: Normal heart sounds.   Pulmonary:      Effort: Pulmonary effort is normal.      Breath sounds: Rhonchi present.      Comments: Scattered   Abdominal:      General: Bowel sounds are normal.      Palpations: Abdomen is soft.   Musculoskeletal:         General: Normal range of motion.      Cervical back: Full passive range of motion without pain and normal range of motion.   Skin:     General: Skin is warm and moist.   Neurological:      General: No focal deficit present.      Mental Status: She is alert and oriented to " "person, place, and time. Mental status is at baseline.   Psychiatric:         Attention and Perception: Attention normal.         Mood and Affect: Mood normal.         Speech: Speech normal.         Behavior: Behavior is cooperative.         Thought Content: Thought content normal.         Cognition and Memory: Cognition normal.         Judgment: Judgment normal.       Current Outpatient Medications   Medication Instructions    ALPRAZolam (XANAX) 1 mg, Nightly PRN    atorvastatin (LIPITOR) 20 mg, oral, Daily    FLUoxetine (PROZAC) 40 mg, oral, Daily    FreeStyle Janneth 2 [employer] misc Use as instructed    FreeStyle Janneth 2 Sensor kit USE AS DIRECTED    insulin glargine (Lantus Solostar U-100 Insulin) 100 unit/mL (3 mL) pen Inject 100 unites daily    insulin lispro (HumaLOG) 100 unit/mL injection To inject mild sliding scale three times a daily  150 or less take no insulin  151 to 200, to take 2 units  201 to 250, to take 4 units  251 to 300 to take 6  units  301 to 350 to take 8 units  351 to 400 to take 10 units    losartan (COZAAR) 50 mg, oral, Daily    lurasidone (LATUDA) 60 mg, oral, Daily with evening meal    medical cannabis 1 each    nystatin (Mycostatin) 100,000 unit/gram powder Apply topically to affected area three times a day as needed    omeprazole (PriLOSEC) 20 mg DR capsule TAKE 2 CAPSULES BY MOUTH ONCE DAILY. DO NOT CRUSH OR CHEW.    ondansetron (ZOFRAN) 4 mg, oral, Every 6 hours during day    pen needle, diabetic (BD Ultra-Fine Short Pen Needle) 31 gauge x 5/16\" needle USE 1 WITH INSULIN PEN TWICE DAILY     Lab Results   Component Value Date    WBC 8.5 07/27/2023    HGB 11.2 (L) 07/27/2023    HCT 38.5 07/27/2023     07/27/2023    CHOL 138 03/02/2023    TRIG 95 03/02/2023    HDL 38.0 (A) 03/02/2023    ALT 13 07/27/2023    AST 13 07/27/2023     07/27/2023    K 3.7 07/27/2023     07/27/2023    CREATININE 0.63 07/27/2023    BUN 10 07/27/2023    CO2 23 07/27/2023    TSH 1.52 07/27/2023    " HGBA1C 7.0 (A) 02/27/2025       Problem List Items Addressed This Visit             ICD-10-CM    Benign hypertension I10     -BP today was 13/86  -Continue taking Losartan         Relevant Medications    losartan (Cozaar) 50 mg tablet    Hypercholesterolemia E78.00     -continue taking Atorvastatin  - complete lipid panel before next appointment         Relevant Medications    atorvastatin (Lipitor) 20 mg tablet    Insomnia G47.00     -Try to set a bedtime that is early enough that you will get at least 7 hours of sleep.   -Keep a consistent sleep schedule            Relevant Medications    lurasidone (Latuda) 60 mg tablet    Schizophrenia F20.9     -follow up with psych as needed  -continue taking Fluoxetine and Latuda  -may need to discuss a sleep medication          Relevant Medications    FLUoxetine (PROzac) 40 mg capsule    Type 1 diabetes mellitus E10.9     -A1C was 7.0 today which is down from 7.3 on last visit  -referral placed for nutritional services  -make sure that you are eating throughout the day , try to eat smaller portions more often to prevent your blood sugar  from dropping during the night  -continue to monitor blood sugar at home  -recheck A1C in 3 months            Relevant Medications    insulin lispro (HumaLOG) 100 unit/mL injection    Other Relevant Orders    POCT glycosylated hemoglobin (Hb A1C) manually resulted (Completed)    Albumin-Creatinine Ratio, Urine Random    Referral to Nutrition Services    Gastroesophageal reflux disease K21.9     -Condition is stable  -Continue taking Prilosec           Relevant Medications    omeprazole (PriLOSEC) 20 mg DR capsule    Annual physical exam Z00.00     -complete lab orders and cologuard that were previously ordered   Schedule next physical in one year         Tobacco dependency F17.200     -Current everyday smoker  -provided resources to use when ready to quit            --------------------  Written by RANULFO HOPPER LPN, acting as a  geri for Dr. Lopez. This note accurately reflects the work and decisions made by Dr. Lopez.     I, Dr. Lopez, attest all medical record entries made by the jadynibflor were under my direction and were personally dictated by me. I have reviewed the chart and agree that the record accurately reflects my performance of the history, physical exam, and assessment and plan. Patient was identified as a fall risk. Risk prevention instructions provided.Patient was identified as a fall risk. Risk prevention instructions provided.

## 2025-02-27 ENCOUNTER — APPOINTMENT (OUTPATIENT)
Dept: PRIMARY CARE | Facility: CLINIC | Age: 55
End: 2025-02-27
Payer: MEDICARE

## 2025-02-27 VITALS
RESPIRATION RATE: 18 BRPM | SYSTOLIC BLOOD PRESSURE: 130 MMHG | HEIGHT: 61 IN | DIASTOLIC BLOOD PRESSURE: 86 MMHG | HEART RATE: 74 BPM | WEIGHT: 214 LBS | BODY MASS INDEX: 40.4 KG/M2 | OXYGEN SATURATION: 97 %

## 2025-02-27 DIAGNOSIS — F17.200 TOBACCO DEPENDENCY: ICD-10-CM

## 2025-02-27 DIAGNOSIS — K21.9 GASTROESOPHAGEAL REFLUX DISEASE, UNSPECIFIED WHETHER ESOPHAGITIS PRESENT: ICD-10-CM

## 2025-02-27 DIAGNOSIS — G47.00 INSOMNIA, UNSPECIFIED TYPE: ICD-10-CM

## 2025-02-27 DIAGNOSIS — F20.9 SCHIZOPHRENIA, UNSPECIFIED TYPE: ICD-10-CM

## 2025-02-27 DIAGNOSIS — Z00.00 ANNUAL PHYSICAL EXAM: ICD-10-CM

## 2025-02-27 DIAGNOSIS — I10 BENIGN HYPERTENSION: ICD-10-CM

## 2025-02-27 DIAGNOSIS — E10.9 TYPE 1 DIABETES MELLITUS WITHOUT COMPLICATION: ICD-10-CM

## 2025-02-27 DIAGNOSIS — E78.00 HYPERCHOLESTEROLEMIA: ICD-10-CM

## 2025-02-27 LAB — POC HEMOGLOBIN A1C: 7 % (ref 4.2–6.5)

## 2025-02-27 PROCEDURE — 83036 HEMOGLOBIN GLYCOSYLATED A1C: CPT | Performed by: INTERNAL MEDICINE

## 2025-02-27 PROCEDURE — 4010F ACE/ARB THERAPY RXD/TAKEN: CPT | Performed by: INTERNAL MEDICINE

## 2025-02-27 PROCEDURE — 3075F SYST BP GE 130 - 139MM HG: CPT | Performed by: INTERNAL MEDICINE

## 2025-02-27 PROCEDURE — 3008F BODY MASS INDEX DOCD: CPT | Performed by: INTERNAL MEDICINE

## 2025-02-27 PROCEDURE — 3079F DIAST BP 80-89 MM HG: CPT | Performed by: INTERNAL MEDICINE

## 2025-02-27 PROCEDURE — 99396 PREV VISIT EST AGE 40-64: CPT | Performed by: INTERNAL MEDICINE

## 2025-02-27 RX ORDER — OMEPRAZOLE 20 MG/1
CAPSULE, DELAYED RELEASE ORAL
Qty: 60 CAPSULE | Refills: 0 | Status: SHIPPED | OUTPATIENT
Start: 2025-02-27

## 2025-02-27 RX ORDER — FLUOXETINE HYDROCHLORIDE 40 MG/1
40 CAPSULE ORAL DAILY
Qty: 90 CAPSULE | Refills: 0 | Status: SHIPPED | OUTPATIENT
Start: 2025-02-27

## 2025-02-27 RX ORDER — ATORVASTATIN CALCIUM 20 MG/1
20 TABLET, FILM COATED ORAL DAILY
Qty: 90 TABLET | Refills: 0 | Status: SHIPPED | OUTPATIENT
Start: 2025-02-27

## 2025-02-27 RX ORDER — LOSARTAN POTASSIUM 50 MG/1
50 TABLET ORAL DAILY
Qty: 90 TABLET | Refills: 0 | Status: SHIPPED | OUTPATIENT
Start: 2025-02-27

## 2025-02-27 RX ORDER — INSULIN LISPRO 100 [IU]/ML
INJECTION, SOLUTION INTRAVENOUS; SUBCUTANEOUS
Qty: 15 ML | Refills: 0 | Status: SHIPPED | OUTPATIENT
Start: 2025-02-27

## 2025-02-27 RX ORDER — INSULIN LISPRO 100 [IU]/ML
INJECTION, SOLUTION INTRAVENOUS; SUBCUTANEOUS
Qty: 15 ML | Refills: 0 | Status: SHIPPED | OUTPATIENT
Start: 2025-02-27 | End: 2025-02-27

## 2025-02-27 RX ORDER — LURASIDONE HYDROCHLORIDE 60 MG/1
60 TABLET, FILM COATED ORAL
Qty: 90 TABLET | Refills: 0 | Status: SHIPPED | OUTPATIENT
Start: 2025-02-27

## 2025-02-27 ASSESSMENT — ENCOUNTER SYMPTOMS
GASTROINTESTINAL NEGATIVE: 1
CONSTITUTIONAL NEGATIVE: 1
RESPIRATORY NEGATIVE: 1
MUSCULOSKELETAL NEGATIVE: 1
PSYCHIATRIC NEGATIVE: 1
EYES NEGATIVE: 1
CARDIOVASCULAR NEGATIVE: 1
NEUROLOGICAL NEGATIVE: 1

## 2025-02-27 ASSESSMENT — PAIN SCALES - GENERAL: PAINLEVEL_OUTOF10: 0-NO PAIN

## 2025-02-27 NOTE — ASSESSMENT & PLAN NOTE
-A1C was 7.0 today which is down from 7.3 on last visit  -referral placed for nutritional services  -make sure that you are eating throughout the day , try to eat smaller portions more often to prevent your blood sugar  from dropping during the night  -continue to monitor blood sugar at home  -recheck A1C in 3 months

## 2025-02-27 NOTE — ASSESSMENT & PLAN NOTE
-continue taking Atorvastatin  - complete lipid panel before next appointment   Medication(s) requested: zolpidem  Last office visit: 3-2-23  Next office visit: 8-31-23  Last refill given: 12-21-22  Last refill picked up from pharmacy: 12-21-22  Contract present: y  Date of contract: 3-2-23  Last urine drug screen: 3-2-23    Is the patient due for refill of this medication(s): Yes  PDMP review: Criteria met. Forwarded to Physician/ESTEBAN for signature.

## 2025-02-27 NOTE — ASSESSMENT & PLAN NOTE
-Try to set a bedtime that is early enough that you will get at least 7 hours of sleep.   -Keep a consistent sleep schedule      Problem: Falls - Risk of:  Goal: Will remain free from falls  Description: Will remain free from falls  Outcome: Ongoing  Goal: Absence of physical injury  Description: Absence of physical injury  Outcome: Ongoing     Problem: Pain:  Goal: Pain level will decrease  Description: Pain level will decrease  Outcome: Ongoing  Goal: Control of acute pain  Description: Control of acute pain  Outcome: Ongoing  Goal: Control of chronic pain  Description: Control of chronic pain  Outcome: Ongoing     Problem: Skin Integrity:  Goal: Will show no infection signs and symptoms  Description: Will show no infection signs and symptoms  Outcome: Ongoing  Goal: Absence of new skin breakdown  Description: Absence of new skin breakdown  Outcome: Ongoing     Problem: Nutrition  Goal: Optimal nutrition therapy  Outcome: Ongoing

## 2025-02-27 NOTE — ASSESSMENT & PLAN NOTE
-complete lab orders and cologuard that were previously ordered   Schedule next physical in one year

## 2025-02-27 NOTE — ASSESSMENT & PLAN NOTE
-follow up with psych as needed  -continue taking Fluoxetine and Latuda  -may need to discuss a sleep medication

## 2025-03-03 ENCOUNTER — HOSPITAL ENCOUNTER (OUTPATIENT)
Dept: RADIOLOGY | Facility: HOSPITAL | Age: 55
Discharge: HOME | End: 2025-03-03
Payer: MEDICARE

## 2025-03-03 DIAGNOSIS — R92.8 ABNORMAL MAMMOGRAM: ICD-10-CM

## 2025-03-03 PROCEDURE — 76642 ULTRASOUND BREAST LIMITED: CPT | Mod: RT

## 2025-03-03 PROCEDURE — 77065 DX MAMMO INCL CAD UNI: CPT | Mod: RIGHT SIDE | Performed by: RADIOLOGY

## 2025-03-03 PROCEDURE — 77061 BREAST TOMOSYNTHESIS UNI: CPT | Mod: RT

## 2025-03-03 PROCEDURE — 77061 BREAST TOMOSYNTHESIS UNI: CPT | Mod: RIGHT SIDE | Performed by: RADIOLOGY

## 2025-03-03 PROCEDURE — 76642 ULTRASOUND BREAST LIMITED: CPT | Mod: RIGHT SIDE | Performed by: RADIOLOGY

## 2025-03-04 DIAGNOSIS — N64.89 BREAST ASYMMETRY: ICD-10-CM

## 2025-03-13 ENCOUNTER — APPOINTMENT (OUTPATIENT)
Dept: NUTRITION | Facility: HOSPITAL | Age: 55
End: 2025-03-13
Payer: MEDICARE

## 2025-03-19 DIAGNOSIS — E10.9 TYPE 1 DIABETES MELLITUS WITHOUT COMPLICATION: ICD-10-CM

## 2025-03-19 RX ORDER — BLOOD-GLUCOSE,RECEIVER,CONT
EACH MISCELLANEOUS
Qty: 1 EACH | Refills: 0 | Status: SHIPPED | OUTPATIENT
Start: 2025-03-19

## 2025-03-19 RX ORDER — BLOOD-GLUCOSE SENSOR
EACH MISCELLANEOUS
Qty: 5 EACH | Refills: 3 | Status: SHIPPED | OUTPATIENT
Start: 2025-03-19

## 2025-03-20 ENCOUNTER — APPOINTMENT (OUTPATIENT)
Facility: CLINIC | Age: 55
End: 2025-03-20
Payer: MEDICARE

## 2025-03-20 DIAGNOSIS — E10.65 TYPE 1 DIABETES MELLITUS WITH HYPERGLYCEMIA (MULTI): ICD-10-CM

## 2025-03-20 RX ORDER — BLOOD-GLUCOSE,RECEIVER,CONT
EACH MISCELLANEOUS
Qty: 1 EACH | Refills: 0 | Status: SHIPPED | OUTPATIENT
Start: 2025-03-20

## 2025-03-20 RX ORDER — BLOOD-GLUCOSE SENSOR
EACH MISCELLANEOUS
Qty: 1 EACH | Refills: 0 | Status: SHIPPED | OUTPATIENT
Start: 2025-03-20

## 2025-03-25 DIAGNOSIS — E10.9 TYPE 1 DIABETES MELLITUS WITHOUT COMPLICATION: ICD-10-CM

## 2025-03-25 RX ORDER — BLOOD-GLUCOSE TRANSMITTER
1 EACH MISCELLANEOUS ONCE
COMMUNITY
End: 2025-03-25 | Stop reason: SDUPTHER

## 2025-03-25 RX ORDER — BLOOD-GLUCOSE TRANSMITTER
1 EACH MISCELLANEOUS ONCE
Qty: 1 EACH | Refills: 0 | Status: SHIPPED | OUTPATIENT
Start: 2025-03-25 | End: 2025-03-25

## 2025-03-27 DIAGNOSIS — E11.9 TYPE 2 DIABETES MELLITUS WITHOUT COMPLICATION, WITHOUT LONG-TERM CURRENT USE OF INSULIN: ICD-10-CM

## 2025-03-28 RX ORDER — FLASH GLUCOSE SENSOR
KIT MISCELLANEOUS
Qty: 2 EACH | Refills: 0 | OUTPATIENT
Start: 2025-03-28

## 2025-03-30 DIAGNOSIS — Z79.4 TYPE 2 DIABETES MELLITUS WITHOUT COMPLICATION, WITH LONG-TERM CURRENT USE OF INSULIN: ICD-10-CM

## 2025-03-30 DIAGNOSIS — E11.9 TYPE 2 DIABETES MELLITUS WITHOUT COMPLICATION, WITH LONG-TERM CURRENT USE OF INSULIN: ICD-10-CM

## 2025-03-31 DIAGNOSIS — E78.00 HYPERCHOLESTEROLEMIA: ICD-10-CM

## 2025-03-31 DIAGNOSIS — F20.9 SCHIZOPHRENIA, UNSPECIFIED TYPE: ICD-10-CM

## 2025-03-31 DIAGNOSIS — I10 BENIGN HYPERTENSION: ICD-10-CM

## 2025-03-31 DIAGNOSIS — R11.0 NAUSEA: ICD-10-CM

## 2025-03-31 DIAGNOSIS — E10.9 TYPE 1 DIABETES MELLITUS WITHOUT COMPLICATION: ICD-10-CM

## 2025-03-31 DIAGNOSIS — K21.9 GASTROESOPHAGEAL REFLUX DISEASE, UNSPECIFIED WHETHER ESOPHAGITIS PRESENT: ICD-10-CM

## 2025-03-31 RX ORDER — OMEPRAZOLE 20 MG/1
CAPSULE, DELAYED RELEASE ORAL
Qty: 60 CAPSULE | Refills: 0 | Status: SHIPPED | OUTPATIENT
Start: 2025-03-31

## 2025-03-31 RX ORDER — ATORVASTATIN CALCIUM 20 MG/1
20 TABLET, FILM COATED ORAL DAILY
Qty: 90 TABLET | Refills: 0 | Status: SHIPPED | OUTPATIENT
Start: 2025-03-31

## 2025-03-31 RX ORDER — PEN NEEDLE, DIABETIC 30 GX3/16"
NEEDLE, DISPOSABLE MISCELLANEOUS
Qty: 200 EACH | Refills: 0 | Status: SHIPPED | OUTPATIENT
Start: 2025-03-31

## 2025-03-31 RX ORDER — INSULIN GLARGINE 100 [IU]/ML
INJECTION, SOLUTION SUBCUTANEOUS
Qty: 15 ML | Refills: 1 | Status: SHIPPED | OUTPATIENT
Start: 2025-03-31

## 2025-03-31 RX ORDER — LOSARTAN POTASSIUM 50 MG/1
50 TABLET ORAL DAILY
Qty: 90 TABLET | Refills: 0 | Status: SHIPPED | OUTPATIENT
Start: 2025-03-31

## 2025-03-31 RX ORDER — FLUOXETINE HYDROCHLORIDE 40 MG/1
40 CAPSULE ORAL DAILY
Qty: 90 CAPSULE | Refills: 0 | Status: SHIPPED | OUTPATIENT
Start: 2025-03-31

## 2025-03-31 RX ORDER — INSULIN LISPRO 100 [IU]/ML
INJECTION, SOLUTION INTRAVENOUS; SUBCUTANEOUS
Qty: 15 ML | Refills: 0 | Status: SHIPPED | OUTPATIENT
Start: 2025-03-31

## 2025-04-03 ENCOUNTER — APPOINTMENT (OUTPATIENT)
Dept: NUTRITION | Facility: HOSPITAL | Age: 55
End: 2025-04-03
Payer: MEDICARE

## 2025-04-18 ENCOUNTER — APPOINTMENT (OUTPATIENT)
Dept: NUTRITION | Facility: HOSPITAL | Age: 55
End: 2025-04-18
Payer: MEDICARE

## 2025-04-18 NOTE — PROGRESS NOTES
Nutrition Assessment     Reason for Visit:  Km Tam is a 54 y.o. female who presents for No chief complaint on file.    Anthropometrics:  Wt Readings from Last 10 Encounters:   02/27/25 97.1 kg (214 lb)   02/20/25 96.2 kg (212 lb)   02/20/25 96.4 kg (212 lb 9.6 oz)   02/11/25 95.7 kg (211 lb)   02/06/25 96.2 kg (212 lb)   02/03/25 95.8 kg (211 lb 3.2 oz)   08/22/24 98.2 kg (216 lb 9.6 oz)   04/08/24 102 kg (224 lb)   03/25/24 100 kg (221 lb 1.6 oz)   11/14/23 103 kg (226 lb)                 Food And Nutrient Intake:                                                                 Factors:                         Physical Activities:              Knowledge Beliefs Attitudes and Behavior                                       Nutrition Focused Physical Exam:           Energy Needs           Nutrition Diagnosis           Nutrition Interventions/Recommendations            There are no Patient Instructions on file for this visit.    Nutrition Monitoring and Evaluation                   {Readiness to Change (Optional):05559}  {Level of Understanding (Optional):74232}  {Anticipated Compliant (Optional):58931}

## 2025-05-01 DIAGNOSIS — E10.9 TYPE 1 DIABETES MELLITUS WITHOUT COMPLICATION: ICD-10-CM

## 2025-05-02 RX ORDER — INSULIN GLARGINE 100 [IU]/ML
INJECTION, SOLUTION SUBCUTANEOUS
Qty: 15 ML | Refills: 1 | Status: SHIPPED | OUTPATIENT
Start: 2025-05-02

## 2025-05-02 ASSESSMENT — ENCOUNTER SYMPTOMS
HEADACHES: 1
FATIGUE: 1
CONFUSION: 0
BLURRED VISION: 1
NERVOUS/ANXIOUS: 0
DIZZINESS: 1
SPEECH DIFFICULTY: 0
SWEATS: 1
BLACKOUTS: 0

## 2025-05-08 ENCOUNTER — APPOINTMENT (OUTPATIENT)
Dept: OBSTETRICS AND GYNECOLOGY | Facility: CLINIC | Age: 55
End: 2025-05-08
Payer: MEDICARE

## 2025-05-08 VITALS
HEIGHT: 61 IN | WEIGHT: 216 LBS | DIASTOLIC BLOOD PRESSURE: 80 MMHG | BODY MASS INDEX: 40.78 KG/M2 | SYSTOLIC BLOOD PRESSURE: 132 MMHG

## 2025-05-08 DIAGNOSIS — N95.1 MENOPAUSAL SYMPTOM: Primary | ICD-10-CM

## 2025-05-08 PROCEDURE — 99213 OFFICE O/P EST LOW 20 MIN: CPT | Performed by: NURSE PRACTITIONER

## 2025-05-08 PROCEDURE — 3051F HG A1C>EQUAL 7.0%<8.0%: CPT | Performed by: NURSE PRACTITIONER

## 2025-05-08 PROCEDURE — 3079F DIAST BP 80-89 MM HG: CPT | Performed by: NURSE PRACTITIONER

## 2025-05-08 PROCEDURE — 4010F ACE/ARB THERAPY RXD/TAKEN: CPT | Performed by: NURSE PRACTITIONER

## 2025-05-08 PROCEDURE — 3008F BODY MASS INDEX DOCD: CPT | Performed by: NURSE PRACTITIONER

## 2025-05-08 PROCEDURE — 3075F SYST BP GE 130 - 139MM HG: CPT | Performed by: NURSE PRACTITIONER

## 2025-05-08 ASSESSMENT — ENCOUNTER SYMPTOMS
ENDOCRINE COMMENTS: +HOT FLASHES
CONFUSION: 0
NAUSEA: 1
DIZZINESS: 1
HEADACHES: 1
NERVOUS/ANXIOUS: 0
ARTHRALGIAS: 1
SPEECH DIFFICULTY: 0
VOMITING: 1
FATIGUE: 1

## 2025-05-08 NOTE — PROGRESS NOTES
"Chief Complaint    MENOPAUSAL SYMPTOMS        HPI    - HOT FLASHES , COLD SWEATS, HEADACHES - MIGRAINES, DIZZY - SX'S JUST STARTED  Last edited by Kingston Diaz MA on 2025 10:30 AM.         54 y.o.  female presents for concerns about possible menopausal symptoms.    She is post-menopausal, menopause reached at age 48.  Denies PMB.    Reports recent onset of symptoms.  All of her symptoms of concern today started after she had an accidental fall last week. States she did not hit her head or go to the ED to get evaluated. She fell on her side and shoulder.   She does not remember how she fell, knows she was walking on a flat surface, did not trip, fell spontaneously.  Since this incident she has had hot flashes, nights sweats.  Has h/o migraines but has had worsening headaches.  Noticed a stiffness in her left leg.  Has had nausea, vomiting over the past week.   Also endorses vision changes, dizziness, and brain fog.     She is a smoker.   PMH: Diabetes, hyperlipidemia, hypertension, OCD, schizophrenia, PTSD, depression, obesity BMI 40  Family hx is unknown, patient adopted.     /80   Ht 1.549 m (5' 1\")   Wt 98 kg (216 lb)   BMI 40.81 kg/m²      Current Outpatient Medications   Medication Instructions    ALPRAZolam (XANAX) 1 mg, Nightly PRN    atorvastatin (LIPITOR) 20 mg, oral, Daily    Dexcom G6  misc Use as instructed    Dexcom G6 Sensor device Use as directed    Dexcom G7  misc Use as instructed    FLUoxetine (PROZAC) 40 mg, oral, Daily    FreeStyle Janneth 2 [place] misc Use as instructed    FreeStyle Janneth 2 Sensor kit USE AS DIRECTED    insulin glargine (Lantus Solostar U-100 Insulin) 100 unit/mL (3 mL) pen Inject 100 unites daily    insulin lispro (HumaLOG) 100 unit/mL pen To inject mild sliding scale three times a daily  150 or less take no insulin  151 to 200, to take 2 units  201 to 250, to take 4 units  251 to 300 to take 6  units  301 to 350 to take 8 units  351 to 400 to " "take 10 units    losartan (COZAAR) 50 mg, oral, Daily    lurasidone (LATUDA) 60 mg, oral, Daily with evening meal    medical cannabis 1 each    nystatin (Mycostatin) 100,000 unit/gram powder Apply topically to affected area three times a day as needed    omeprazole (PriLOSEC) 20 mg DR capsule TAKE 2 CAPSULES BY MOUTH ONCE DAILY. DO NOT CRUSH OR CHEW.    ondansetron (ZOFRAN) 4 mg, oral, Every 6 hours during day    pen needle, diabetic (BD Ultra-Fine Short Pen Needle) 31 gauge x 5/16\" needle USE 1 WITH INSULIN PEN TWICE DAILY        Review of Systems   Constitutional:  Positive for fatigue.   Eyes:  Positive for visual disturbance.   Gastrointestinal:  Positive for nausea and vomiting.   Endocrine:        +Hot flashes   Genitourinary:  Negative for vaginal bleeding.   Musculoskeletal:  Positive for arthralgias.   Neurological:  Positive for dizziness and headaches. Negative for speech difficulty.   Psychiatric/Behavioral:  Negative for confusion. The patient is not nervous/anxious.         Physical Exam  Constitutional:       General: She is not in acute distress.     Appearance: Normal appearance.   HENT:      Head: Normocephalic.      Nose: Nose normal.   Pulmonary:      Effort: Pulmonary effort is normal.   Genitourinary:     Comments: Deferred  Musculoskeletal:         General: Normal range of motion.      Cervical back: Normal range of motion and neck supple.   Neurological:      Mental Status: She is alert.   Psychiatric:         Mood and Affect: Mood normal.         Behavior: Behavior normal.          Assessment/Plan:  1. Menopausal symptom (Primary)  -Discussed patient's sudden onset of symptoms. Though hot flashes, night sweats are a symptoms of menopause she had not been experiencing this for the last 6 years she has been in menopause. Her symptoms also started acutely after experiencing a recent accidental fall. Counseled I am concerned about her symptoms but do not think they are related to menopause " alone. I recommend she follows up with her PCP for further evaluation.  -We discussed some supplement options for hot flashes including Estroven or [employer]'s Thermella.   -Patient to follow up with me as needed.  -Go to ED for any emergencies such as CP, SOB, loss of consciousness, near syncope, weakness, numbness.

## 2025-05-09 DIAGNOSIS — B35.3 TINEA PEDIS OF BOTH FEET: Primary | ICD-10-CM

## 2025-05-09 PROBLEM — Z00.00 ENCOUNTER FOR ROUTINE ADULT HEALTH EXAMINATION WITHOUT ABNORMAL FINDINGS: Status: ACTIVE | Noted: 2025-05-09

## 2025-05-09 PROBLEM — R92.8 ABNORMAL MAMMOGRAM: Status: ACTIVE | Noted: 2025-05-09

## 2025-05-09 PROBLEM — R51.9 ACUTE NONINTRACTABLE HEADACHE: Status: ACTIVE | Noted: 2025-05-09

## 2025-05-09 RX ORDER — CLOTRIMAZOLE 1 %
CREAM (GRAM) TOPICAL 2 TIMES DAILY
Qty: 30 G | Refills: 0 | Status: SHIPPED | OUTPATIENT
Start: 2025-05-09 | End: 2025-06-06

## 2025-05-09 NOTE — ASSESSMENT & PLAN NOTE
- 2/20/25 mammogram -Right breast asymmetry   - Right breast US 3/3/25: A small probably benign right focal asymmetry should be followed in 6 months.   - Due for repeat diagnostic mammogram in August 2025; ordered today

## 2025-05-09 NOTE — PROGRESS NOTES
"Patient ID:   Km Tam is a 54 y.o. female with PMH remarkable for chronic back pain, HTN, HLD, DM1, schizophrenia, PTSD who presents to the office today for No chief complaint on file..    Last Office Visit: 2/27/25 with Dr. Lopez     HPI:    Seen by OB/GYN 5/8/25 for headaches, dizziness, hot flashes that patient thought were due to menopause, symptoms reportedly started after a fall about a week ago. Per OBGYN note, \"All of her symptoms of concern today started after she had an accidental fall last week. States she did not hit her head or go to the ED to get evaluated. She fell on her side and shoulder. She does not remember how she fell, knows she was walking on a flat surface, did not trip, fell spontaneously. Since this incident she has had hot flashes, nights sweats. Has h/o migraines but has had worsening headaches. Noticed a stiffness in her left leg. Has had nausea, vomiting over the past week.  Also endorses vision changes, dizziness, and brain fog. Discussed patient's sudden onset of symptoms. Though hot flashes, night sweats are a symptoms of menopause she had not been experiencing this for the last 6 years she has been in menopause. Her symptoms also started acutely after experiencing a recent accidental fall. Counseled I am concerned about her symptoms but do not think they are related to menopause alone. I recommend she follows up with her PCP for further evaluation.\"    Today, patient reports    Social History     Tobacco Use    Smoking status: Every Day     Current packs/day: 1.00     Average packs/day: 1 pack/day for 1.2 years (1.2 ttl pk-yrs)     Types: Cigarettes     Start date: 10/1/2024    Smokeless tobacco: Never    Tobacco comments:     I started smoking when I was 22. I quit when I had my son in 1999, then i started up 2024   Substance Use Topics    Alcohol use: Never      Review of Systems  Visit Vitals  OB Status Premenopausal   Smoking Status Every Day     Allergies[1]   Physical " "Exam  Current Outpatient Medications   Medication Instructions    ALPRAZolam (XANAX) 1 mg, Nightly PRN    atorvastatin (LIPITOR) 20 mg, oral, Daily    clotrimazole (Lotrimin) 1 % cream Topical, 2 times daily, apply to affected area    Dexcom G6  misc Use as instructed    Dexcom G6 Sensor device Use as directed    Dexcom G7  misc Use as instructed    FLUoxetine (PROZAC) 40 mg, oral, Daily    FreeStyle Janneth 2 [place] misc Use as instructed    FreeStyle Janneth 2 Sensor kit USE AS DIRECTED    insulin glargine (Lantus Solostar U-100 Insulin) 100 unit/mL (3 mL) pen Inject 100 unites daily    insulin lispro (HumaLOG) 100 unit/mL pen To inject mild sliding scale three times a daily  150 or less take no insulin  151 to 200, to take 2 units  201 to 250, to take 4 units  251 to 300 to take 6  units  301 to 350 to take 8 units  351 to 400 to take 10 units    losartan (COZAAR) 50 mg, oral, Daily    lurasidone (LATUDA) 60 mg, oral, Daily with evening meal    medical cannabis 1 each    nystatin (Mycostatin) 100,000 unit/gram powder Apply topically to affected area three times a day as needed    omeprazole (PriLOSEC) 20 mg DR capsule TAKE 2 CAPSULES BY MOUTH ONCE DAILY. DO NOT CRUSH OR CHEW.    ondansetron (ZOFRAN) 4 mg, oral, Every 6 hours during day    pen needle, diabetic (BD Ultra-Fine Short Pen Needle) 31 gauge x 5/16\" needle USE 1 WITH INSULIN PEN TWICE DAILY      Lab Results   Component Value Date    WBC 8.5 07/27/2023    HGB 11.2 (L) 07/27/2023    HCT 38.5 07/27/2023     07/27/2023    CHOL 138 03/02/2023    TRIG 95 03/02/2023    HDL 38.0 (A) 03/02/2023    ALT 13 07/27/2023    AST 13 07/27/2023     07/27/2023    K 3.7 07/27/2023     07/27/2023    CREATININE 0.63 07/27/2023    BUN 10 07/27/2023    CO2 23 07/27/2023    TSH 1.52 07/27/2023    HGBA1C 7.0 (A) 02/27/2025       Assessment/Plan   Problem List Items Addressed This Visit           ICD-10-CM       Genitourinary and Reproductive    " Abnormal mammogram R92.8    - 2/20/25 mammogram -Right breast asymmetry   - Right breast US 3/3/25: A small probably benign right focal asymmetry should be followed in 6 months.   - Due for repeat diagnostic mammogram in August 2025; ordered today            Health Encounters    Encounter for routine adult health examination without abnormal findings Z00.00    - Overdue for labs; ordered              Neuro    Acute nonintractable headache - Primary R51.9    - Patient reports headaches since she had a fall about a week ago, denies head injury  - CT head ordered           ------         [1]   Allergies  Allergen Reactions    Tramadol Other     Other reaction(s): GI Upset

## 2025-05-09 NOTE — ASSESSMENT & PLAN NOTE
- Patient reports headaches since she had a fall about a week ago, denies head injury  - CT head ordered

## 2025-05-12 ENCOUNTER — APPOINTMENT (OUTPATIENT)
Dept: PRIMARY CARE | Facility: CLINIC | Age: 55
End: 2025-05-12
Payer: MEDICARE

## 2025-05-12 DIAGNOSIS — R92.8 ABNORMAL MAMMOGRAM: ICD-10-CM

## 2025-05-12 DIAGNOSIS — R21 RASH: ICD-10-CM

## 2025-05-12 DIAGNOSIS — Z00.00 ENCOUNTER FOR ROUTINE ADULT HEALTH EXAMINATION WITHOUT ABNORMAL FINDINGS: ICD-10-CM

## 2025-05-12 DIAGNOSIS — R51.9 ACUTE NONINTRACTABLE HEADACHE, UNSPECIFIED HEADACHE TYPE: Primary | ICD-10-CM

## 2025-05-23 DIAGNOSIS — B35.3 TINEA PEDIS OF BOTH FEET: ICD-10-CM

## 2025-05-23 RX ORDER — CLOTRIMAZOLE 1 %
CREAM (GRAM) TOPICAL
Qty: 30 G | Refills: 0 | Status: SHIPPED | OUTPATIENT
Start: 2025-05-23

## 2025-05-27 DIAGNOSIS — Z79.4 TYPE 2 DIABETES MELLITUS WITHOUT COMPLICATION, WITH LONG-TERM CURRENT USE OF INSULIN: ICD-10-CM

## 2025-05-27 DIAGNOSIS — E11.9 TYPE 2 DIABETES MELLITUS WITHOUT COMPLICATION, WITH LONG-TERM CURRENT USE OF INSULIN: ICD-10-CM

## 2025-05-27 RX ORDER — PEN NEEDLE, DIABETIC 31 GX5/16"
NEEDLE, DISPOSABLE MISCELLANEOUS
Qty: 200 EACH | Refills: 0 | Status: SHIPPED | OUTPATIENT
Start: 2025-05-27

## 2025-06-30 ASSESSMENT — ENCOUNTER SYMPTOMS
BACK PAIN: 0
FREQUENCY: 1
DYSURIA: 0
CHILLS: 0
HEADACHES: 0
HEADACHES: 0
CONSTIPATION: 0
DIARRHEA: 0
FEVER: 0
CONSTIPATION: 0
FREQUENCY: 0
HEMATURIA: 0
ABDOMINAL PAIN: 0
FLANK PAIN: 0
BACK PAIN: 0
DIARRHEA: 0
FEVER: 0
DYSURIA: 0
HEMATURIA: 0
ABDOMINAL PAIN: 0
FLANK PAIN: 0
VOMITING: 0
ANOREXIA: 0
CHILLS: 0
ANOREXIA: 0

## 2025-07-01 NOTE — ASSESSMENT & PLAN NOTE
- Right breast US 3/3/25: A small probably benign right focal asymmetry should be followed in 6 months.  - Due for repeat US on/after 9/3/25

## 2025-07-01 NOTE — PROGRESS NOTES
"Patient ID:   Km Tam is a 54 y.o. female with PMH remarkable for chronic back pain, HTN, HLD, DM1, schizophrenia, PTSD who presents to the office today for No chief complaint on file..    Last Office Visit: 2/27/25 with Dr. Lopez for follow up     HPI:    Seen by Dr. Lopez on 2/27/25, referred to nutrition for A1c 7.0 but did not keep multiple appointments    Right breast US completed on 3/3/25: A small probably benign right focal asymmetry should be followed in 6 months.    Saw GYN (Nga Barron, CNP) on 5/8/25 for menopausal symptoms (headaches, hot flashes, sweats, dizziness)- per note: \"I am concerned about her symptoms but do not think they are related to menopause alone. I recommend she follows up with her PCP for further evaluation.\"    Patient had appt with myself on 5/12/25 for headaches but canceled    Km presents to the office today       Social History     Tobacco Use    Smoking status: Every Day     Current packs/day: 1.00     Average packs/day: 1 pack/day for 2.0 years (2.0 ttl pk-yrs)     Types: Cigarettes     Start date: 10/1/2024    Smokeless tobacco: Never    Tobacco comments:     I started smoking when I was 22. I quit when I had my son in 1999, then i started up 2024   Substance Use Topics    Alcohol use: Never      Review of Systems  Visit Vitals  OB Status Premenopausal   Smoking Status Every Day     Allergies[1]   Physical Exam  Current Outpatient Medications   Medication Instructions    ALPRAZolam (XANAX) 1 mg, Nightly PRN    atorvastatin (LIPITOR) 20 mg, oral, Daily    BD Ultra-Fine Short Pen Needle 31 gauge x 5/16\" needle USE 1 WITH INSULIN PEN  TWICE DAILY    clotrimazole (Lotrimin) 1 % cream APPLY 1 APPLICATION  TOPICALLY TO AFFECTED AREA TWICE DAILY FOR 28 DAYS    Dexcom G6  misc Use as instructed    Dexcom G6 Sensor device Use as directed    Dexcom G7  misc Use as instructed    FLUoxetine (PROZAC) 40 mg, oral, Daily    FreeStyle Janneth 2 [place] misc Use as " Patient was seen by oncologist today  Has been placed on steroids and Keppra for seizure activity  Patient also was placed on oral steroids  She is going for a PET scan for further evaluation    Important consideration would be palliative care instructed    FreeStyle Janneth 2 Sensor kit USE AS DIRECTED    insulin glargine (Lantus Solostar U-100 Insulin) 100 unit/mL (3 mL) pen Inject 100 unites daily    insulin lispro (HumaLOG) 100 unit/mL pen To inject mild sliding scale three times a daily  150 or less take no insulin  151 to 200, to take 2 units  201 to 250, to take 4 units  251 to 300 to take 6  units  301 to 350 to take 8 units  351 to 400 to take 10 units    losartan (COZAAR) 50 mg, oral, Daily    lurasidone (LATUDA) 60 mg, oral, Daily with evening meal    medical cannabis 1 each    nystatin (Mycostatin) 100,000 unit/gram powder Apply topically to affected area three times a day as needed    omeprazole (PriLOSEC) 20 mg DR capsule TAKE 2 CAPSULES BY MOUTH ONCE DAILY. DO NOT CRUSH OR CHEW.    ondansetron (ZOFRAN) 4 mg, oral, Every 6 hours during day      Lab Results   Component Value Date    WBC 8.5 07/27/2023    HGB 11.2 (L) 07/27/2023    HCT 38.5 07/27/2023     07/27/2023    CHOL 138 03/02/2023    TRIG 95 03/02/2023    HDL 38.0 (A) 03/02/2023    ALT 13 07/27/2023    AST 13 07/27/2023     07/27/2023    K 3.7 07/27/2023     07/27/2023    CREATININE 0.63 07/27/2023    BUN 10 07/27/2023    CO2 23 07/27/2023    TSH 1.52 07/27/2023    HGBA1C 7.0 (A) 02/27/2025       Assessment/Plan   Problem List Items Addressed This Visit           ICD-10-CM       Endocrine/Metabolic    Type 1 diabetes mellitus E10.9    - A1c today ____, previously 7.0 (2/27/25)  - Advised 1800 ADA diet  - Regular exercise is encouraged  - Regular home monitoring of the blood sugar is advised  - Encourage compliance with DM meds   - Pt was referred to nutrition services by Dr. Lopez at last visit but did not keep appointments, encouraged her to reschedule            Relevant Orders    POCT glycosylated hemoglobin (Hb A1C) manually resulted       Genitourinary and Reproductive    Abnormal mammogram - Primary R92.8    - Right breast US 3/3/25: A small probably benign right  focal asymmetry should be followed in 6 months.  - Due for repeat US on/after 9/3/25               Infectious Diseases    Abscess L02.91    - Pt has history of labial abscess and feels that she has another returning  - Has GYN appointment for this on 7/3/25, encouraged patient to keep her appointment            ------Follow up in August for annual MWV.        [1]   Allergies  Allergen Reactions    Tramadol Other     Other reaction(s): GI Upset

## 2025-07-01 NOTE — ASSESSMENT & PLAN NOTE
- Pt has history of labial abscess and feels that she has another returning  - Has GYN appointment for this on 7/3/25, encouraged patient to keep her appointment

## 2025-07-01 NOTE — ASSESSMENT & PLAN NOTE
- A1c today ____, previously 7.0 (2/27/25)  - Advised 1800 ADA diet  - Regular exercise is encouraged  - Regular home monitoring of the blood sugar is advised  - Encourage compliance with DM meds   - Pt was referred to nutrition services by Dr. Lopez at last visit but did not keep appointments, encouraged her to reschedule

## 2025-07-02 ENCOUNTER — APPOINTMENT (OUTPATIENT)
Dept: PRIMARY CARE | Facility: CLINIC | Age: 55
End: 2025-07-02
Payer: MEDICARE

## 2025-07-02 DIAGNOSIS — L02.91 ABSCESS: ICD-10-CM

## 2025-07-02 DIAGNOSIS — E10.9 TYPE 1 DIABETES MELLITUS WITHOUT COMPLICATION: ICD-10-CM

## 2025-07-02 DIAGNOSIS — R92.8 ABNORMAL MAMMOGRAM: Primary | ICD-10-CM

## 2025-07-03 ENCOUNTER — OFFICE VISIT (OUTPATIENT)
Dept: OBSTETRICS AND GYNECOLOGY | Facility: CLINIC | Age: 55
End: 2025-07-03
Payer: MEDICARE

## 2025-07-03 VITALS — BODY MASS INDEX: 42.7 KG/M2 | DIASTOLIC BLOOD PRESSURE: 76 MMHG | WEIGHT: 226 LBS | SYSTOLIC BLOOD PRESSURE: 114 MMHG

## 2025-07-03 DIAGNOSIS — N76.4 VULVAR ABSCESS: Primary | ICD-10-CM

## 2025-07-03 PROCEDURE — 4010F ACE/ARB THERAPY RXD/TAKEN: CPT | Performed by: NURSE PRACTITIONER

## 2025-07-03 PROCEDURE — 3074F SYST BP LT 130 MM HG: CPT | Performed by: NURSE PRACTITIONER

## 2025-07-03 PROCEDURE — 3078F DIAST BP <80 MM HG: CPT | Performed by: NURSE PRACTITIONER

## 2025-07-03 PROCEDURE — 99213 OFFICE O/P EST LOW 20 MIN: CPT | Performed by: NURSE PRACTITIONER

## 2025-07-03 PROCEDURE — 3051F HG A1C>EQUAL 7.0%<8.0%: CPT | Performed by: NURSE PRACTITIONER

## 2025-07-03 RX ORDER — SULFAMETHOXAZOLE AND TRIMETHOPRIM 800; 160 MG/1; MG/1
1 TABLET ORAL 2 TIMES DAILY
Qty: 14 TABLET | Refills: 0 | Status: SHIPPED | OUTPATIENT
Start: 2025-07-03 | End: 2025-07-10

## 2025-07-03 ASSESSMENT — ENCOUNTER SYMPTOMS
FEVER: 0
DYSURIA: 0
CONSTIPATION: 0
CHILLS: 0
DIARRHEA: 0
FLANK PAIN: 0
ABDOMINAL PAIN: 0
HEMATURIA: 0
BACK PAIN: 0
FREQUENCY: 1
HEADACHES: 0

## 2025-07-03 NOTE — PROGRESS NOTES
"Chief Complaint    BOIL ON VAGINA        HPI    PT HAS A BOIL ON VAGINA  Last edited by Nga Urrutia MA on 7/3/2025 10:33 AM.         54 y.o.  female presents for vulvar boil.    Symptoms started 2 days ago.  Located right labia.  Small in size. Draining some.   Not tender or painful but some itching near area.  H/O vulvar boils, abscesses in the past.  Denies fever, chills.     H/O abscess of buttocks in 2024. She was admitted to the hospital and received IV antibiotics. Had surgical I&D and followed up with wound care outpatient.      Issues with urinary incontinence, has to wear adult depends. Has seen UroGYN in the past. Had bladder stimulator placed in 2022. She last saw UroGYN in 2022.     She is a smoker. Counseled cessation.   PMH: Diabetes, hyperlipidemia, hypertension, OCD, schizophrenia, PTSD, depression, obesity BMI 40  Family hx is unknown, patient adopted.     /76   Wt 103 kg (226 lb)   BMI 42.70 kg/m²      Current Outpatient Medications   Medication Instructions    ALPRAZolam (XANAX) 1 mg, Nightly PRN    atorvastatin (LIPITOR) 20 mg, oral, Daily    BD Ultra-Fine Short Pen Needle 31 gauge x \" needle USE 1 WITH INSULIN PEN  TWICE DAILY    clotrimazole (Lotrimin) 1 % cream APPLY 1 APPLICATION  TOPICALLY TO AFFECTED AREA TWICE DAILY FOR 28 DAYS    Dexcom G6  misc Use as instructed    Dexcom G6 Sensor device Use as directed    Dexcom G7  misc Use as instructed    FLUoxetine (PROZAC) 40 mg, oral, Daily    FreeStyle Janneth 2 [place] misc Use as instructed    FreeStyle Janneth 2 Sensor kit USE AS DIRECTED    insulin glargine (Lantus Solostar U-100 Insulin) 100 unit/mL (3 mL) pen Inject 100 unites daily    insulin lispro (HumaLOG) 100 unit/mL pen To inject mild sliding scale three times a daily  150 or less take no insulin  151 to 200, to take 2 units  201 to 250, to take 4 units  251 to 300 to take 6  units  301 to 350 to take 8 units  351 to 400 to take 10 units    " losartan (COZAAR) 50 mg, oral, Daily    lurasidone (LATUDA) 60 mg, oral, Daily with evening meal    medical cannabis 1 each    nystatin (Mycostatin) 100,000 unit/gram powder Apply topically to affected area three times a day as needed    omeprazole (PriLOSEC) 20 mg DR capsule TAKE 2 CAPSULES BY MOUTH ONCE DAILY. DO NOT CRUSH OR CHEW.    ondansetron (ZOFRAN) 4 mg, oral, Every 6 hours during day        Review of Systems   Constitutional:  Negative for chills and fever.   Gastrointestinal:  Negative for abdominal pain, constipation and diarrhea.   Genitourinary:  Positive for frequency, genital sores and urgency. Negative for dysuria, flank pain and hematuria.   Musculoskeletal:  Negative for back pain.   Neurological:  Negative for headaches.        Physical Exam  Constitutional:       Appearance: Normal appearance.   HENT:      Head: Normocephalic.      Nose: Nose normal.   Pulmonary:      Effort: Pulmonary effort is normal.   Genitourinary:         Comments: Small right labial abscess ~3 cm in size, draining white drainage  Left labial sebaceous cyst  Musculoskeletal:         General: Normal range of motion.      Cervical back: Normal range of motion and neck supple.   Skin:     General: Skin is warm and dry.   Neurological:      Mental Status: She is alert.   Psychiatric:         Mood and Affect: Mood normal.         Behavior: Behavior normal.          Assessment/Plan:  1. Vulvar abscess (Primary)  -Discussed findings on exam.  -Rx sent:  - sulfamethoxazole-trimethoprim (Bactrim DS) 800-160 mg tablet; Take 1 tablet by mouth 2 times a day for 7 days.  Dispense: 14 tablet; Refill: 0   -Vulvar skin care reviewed in detail.  -Follow up as needed, for any new or worsening symptoms.

## 2025-07-07 ASSESSMENT — ENCOUNTER SYMPTOMS
SEIZURES: 0
BLURRED VISION: 0
HUNGER: 0
DIZZINESS: 1
HEADACHES: 0
SWEATS: 1
FATIGUE: 1
CONFUSION: 0
BLACKOUTS: 0
VISUAL CHANGE: 0
WEIGHT LOSS: 0
TREMORS: 0
NERVOUS/ANXIOUS: 0
WEAKNESS: 0
SPEECH DIFFICULTY: 0
POLYDIPSIA: 0
POLYPHAGIA: 0

## 2025-07-08 PROBLEM — Z00.00 HEALTHCARE MAINTENANCE: Status: ACTIVE | Noted: 2025-07-08

## 2025-07-08 PROBLEM — N76.4 VULVAR ABSCESS: Status: ACTIVE | Noted: 2025-07-08

## 2025-07-08 NOTE — ASSESSMENT & PLAN NOTE
- Saw GYN on 7/3/25, prescribed Bactrim x 7 days  - Continue with ATBs as prescribed  - Go to ER for any fevers, chills, worsening vulvar pain, nausea, vomiting

## 2025-07-08 NOTE — ASSESSMENT & PLAN NOTE
- A1c today 6.7, previously 7.0 (2/27/25)  - Continue Lantus as prescribed   - Continue with CGM for improved glycemic control   - Advised 1800 ADA diet  - Regular exercise is encouraged  - Regular home monitoring of the blood sugar is advised  - Encourage compliance with DM meds   - Pt was referred to nutrition services by Dr. Lopez at last visit but did not keep appointments, discussed today and she says she is not interested in seeing nutrition   - Follow up next month for annual wellness visit; due for urine protein screen & diabetic eye exam

## 2025-07-08 NOTE — PROGRESS NOTES
"Patient ID:   Km Tam is a 54 y.o. female with PMH remarkable for chronic back pain, fibromyalgia, OA, HTN, HLD, DM1, schizophrenia, PTSD who presents to the office today for Follow-up.    Last Office Visit: 2/27/25 with Dr. Lopez for follow up     HPI:    Seen by Dr. Lopez on 2/27/25, referred to nutrition for A1c 7.0 but did not keep multiple appointments     Right breast US completed on 3/3/25: A small probably benign right focal asymmetry should be followed in 6 months.     Saw GYN (Nga Barron, CNP) on 5/8/25 for menopausal symptoms (headaches, hot flashes, sweats, dizziness)- per note: \"I am concerned about her symptoms but do not think they are related to menopause alone. I recommend she follows up with her PCP for further evaluation.\" Pt had follow up appointments for this but rescheduled x 2     Saw GYN again 7/3/25 for vulvar abscess, being treated with Bactrim x 7 days    Km presents to the office today for follow up. She reports that she is doing well overall. Her A1c today is 6.7. She reports that she is not currently using Humalog and her blood sugar is controlled, typically < 100. She has had 1-2 episodes of hypoglycemia where she felt dizzy when her sugar was in the 70s. She eats 1-2 meals a day but will still find herself binge eating about once a week. During these episodes she will eat cookies and other sweets. She is not interested in seeing nutrition, saying that she doesn't think they will be able to help her. She is currently smoking about 1 ppd. She reports that when she quit over 20 years ago she was able to do so without any nicotine replacement; she just \"put her mind to it\". She started smoking again about 2 years ago due to stress but says she thinks she is ready to start cutting back on smoking again. She is not currently interested in a medication for smoking cessation but will consider if she is having difficulty with quitting again.     Km reports that she has not had " "any further issues with headache or dizziness since being seen by GYN on 5/8/25. She still has some hot flashes but feels they are related to menopause. She does report ongoing difficulty with ambulation due to chronic pain and is requesting a mobility scooter at today's visit. She says she is unable to go to outpatient PT due to transportation issues. She is currently on Bactrim for vulvar abscess and says her symptoms are improving; she has not had any fevers or chills.        Social History     Tobacco Use    Smoking status: Every Day     Current packs/day: 1.00     Average packs/day: 1 pack/day for 2.1 years (2.1 ttl pk-yrs)     Types: Cigarettes     Start date: 10/1/2024    Smokeless tobacco: Never    Tobacco comments:     I started smoking when I was 22. I quit when I had my son in 1999, then i started up 2024   Substance Use Topics    Alcohol use: Never      Review of Systems   Constitutional:  Positive for fatigue. Negative for activity change, appetite change, chills and fever.   HENT:  Negative for congestion.    Respiratory:  Negative for cough and shortness of breath.    Cardiovascular:  Negative for chest pain and leg swelling.   Gastrointestinal:  Negative for abdominal pain, constipation, diarrhea, nausea and vomiting.   Endocrine: Positive for heat intolerance.   Genitourinary:  Negative for difficulty urinating, dysuria and frequency.   Musculoskeletal:  Positive for back pain and gait problem. Negative for joint swelling.   Skin:  Positive for wound (vulvar abscess). Negative for rash.   Neurological:  Negative for dizziness, syncope, weakness, light-headedness, numbness and headaches.   All other systems reviewed and are negative.    Visit Vitals  /80 (BP Location: Right arm, Patient Position: Sitting)   Pulse 86   Resp 18   Ht (!) 1.549 m (5' 1\")   Wt 103 kg (228 lb)   SpO2 96%   BMI 43.08 kg/m²   OB Status Premenopausal   Smoking Status Every Day   BSA 2.11 m²     Allergies[1]   Physical " "Exam  Vitals and nursing note reviewed.   Constitutional:       General: She is not in acute distress.     Appearance: She is obese. She is not toxic-appearing.      Comments: Ambulatory in office without assistance   HENT:      Head: Normocephalic and atraumatic.      Mouth/Throat:      Mouth: Mucous membranes are moist.   Eyes:      General: No scleral icterus.     Conjunctiva/sclera: Conjunctivae normal.   Cardiovascular:      Rate and Rhythm: Normal rate and regular rhythm.   Pulmonary:      Effort: Pulmonary effort is normal. No respiratory distress.      Breath sounds: Normal breath sounds.   Abdominal:      General: There is no distension.      Palpations: Abdomen is soft.      Tenderness: There is no abdominal tenderness.   Musculoskeletal:         General: No swelling.      Cervical back: Normal range of motion.   Skin:     General: Skin is warm and dry.   Neurological:      Mental Status: She is alert and oriented to person, place, and time.      Gait: Gait abnormal.   Psychiatric:         Mood and Affect: Mood normal.         Behavior: Behavior normal.       Current Outpatient Medications   Medication Instructions    ALPRAZolam (XANAX) 1 mg, Nightly PRN    atorvastatin (LIPITOR) 20 mg, oral, Daily    BD Ultra-Fine Short Pen Needle 31 gauge x 5/16\" needle USE 1 WITH INSULIN PEN  TWICE DAILY    clotrimazole (Lotrimin) 1 % cream APPLY 1 APPLICATION  TOPICALLY TO AFFECTED AREA TWICE DAILY FOR 28 DAYS    Dexcom G6 Transmitter device     Dexcom G7  misc Use as instructed    FLUoxetine (PROZAC) 40 mg, oral, Daily    insulin glargine (Lantus Solostar U-100 Insulin) 100 unit/mL (3 mL) pen Inject 100 unites daily    insulin lispro (HumaLOG) 100 unit/mL pen To inject mild sliding scale three times a daily  150 or less take no insulin  151 to 200, to take 2 units  201 to 250, to take 4 units  251 to 300 to take 6  units  301 to 350 to take 8 units  351 to 400 to take 10 units    losartan (COZAAR) 50 mg, oral, " "Daily    lurasidone (LATUDA) 80 mg, Nightly    medical cannabis 1 each    nystatin (Mycostatin) 100,000 unit/gram powder Apply topically to affected area three times a day as needed    omeprazole (PriLOSEC) 20 mg DR capsule TAKE 2 CAPSULES BY MOUTH ONCE DAILY. DO NOT CRUSH OR CHEW.    ondansetron (ZOFRAN) 4 mg, oral, Every 6 hours during day    sulfamethoxazole-trimethoprim (Bactrim DS) 800-160 mg tablet 1 tablet, oral, 2 times daily      Lab Results   Component Value Date    WBC 8.5 07/27/2023    HGB 11.2 (L) 07/27/2023    HCT 38.5 07/27/2023     07/27/2023    CHOL 138 03/02/2023    TRIG 95 03/02/2023    HDL 38.0 (A) 03/02/2023    ALT 13 07/27/2023    AST 13 07/27/2023     07/27/2023    K 3.7 07/27/2023     07/27/2023    CREATININE 0.63 07/27/2023    BUN 10 07/27/2023    CO2 23 07/27/2023    TSH 1.52 07/27/2023    HGBA1C 6.7 (A) 07/09/2025       Assessment/Plan   Problem List Items Addressed This Visit           ICD-10-CM       Cardiac and Vasculature    Benign hypertension I10    - BP today 123/80  - Continue losartan  - Regular exercise and blood pressure monitoring is encouraged              Endocrine/Metabolic    Type 1 diabetes mellitus E10.9    - A1c today 6.7, previously 7.0 (2/27/25)  - Continue Lantus as prescribed   - Continue with CGM for improved glycemic control   - Advised 1800 ADA diet  - Regular exercise is encouraged  - Regular home monitoring of the blood sugar is advised  - Encourage compliance with DM meds   - Pt was referred to nutrition services by Dr. Lopez at last visit but did not keep appointments, discussed today and she says she is not interested in seeing nutrition   - Follow up next month for annual wellness visit; due for urine protein screen & diabetic eye exam         Relevant Medications    BD Ultra-Fine Short Pen Needle 31 gauge x 5/16\" needle    Other Relevant Orders    POCT glycosylated hemoglobin (Hb A1C) manually resulted (Completed)       Genitourinary and " Reproductive    Abnormal mammogram R92.8    - Right breast US 3/3/25: A small probably benign right focal asymmetry should be followed in 6 months.  - Due for repeat US on/after 9/3/25         Vulvar abscess N76.4    - Saw GYN on 7/3/25, prescribed Bactrim x 7 days  - Continue with ATBs as prescribed  - Go to ER for any fevers, chills, worsening vulvar pain, nausea, vomiting             Health Encounters    Healthcare maintenance - Primary Z00.00       Musculoskeletal and Injuries    Fibromyalgia M79.7    - Pt has chronic pain and difficulty with ambulation at times  - Declines referral to outpatient PT citing transportation issues  - Has seen pain management in the past, recommended ketamine infusions but pt was not interested            Relevant Orders    Power mobility device       Tobacco    Tobacco dependency F17.200    - Currently smoking ~ 1 ppd but interested in quitting again  - Declines nicotine patches/medication assistance at this time, pt aware we can revisit if she is having difficulty with quitting   - Follow up in 1 month           ------  Follow up next month for annual wellness visit           [1]   Allergies  Allergen Reactions    Tramadol Other     Other reaction(s): GI Upset

## 2025-07-08 NOTE — ASSESSMENT & PLAN NOTE
- BP today 123/80  - Continue losartan  - Regular exercise and blood pressure monitoring is encouraged

## 2025-07-09 ENCOUNTER — APPOINTMENT (OUTPATIENT)
Dept: PRIMARY CARE | Facility: CLINIC | Age: 55
End: 2025-07-09
Payer: MEDICARE

## 2025-07-09 VITALS
WEIGHT: 228 LBS | RESPIRATION RATE: 18 BRPM | OXYGEN SATURATION: 96 % | SYSTOLIC BLOOD PRESSURE: 123 MMHG | DIASTOLIC BLOOD PRESSURE: 80 MMHG | HEART RATE: 86 BPM | HEIGHT: 61 IN | BODY MASS INDEX: 43.05 KG/M2

## 2025-07-09 DIAGNOSIS — I10 BENIGN HYPERTENSION: ICD-10-CM

## 2025-07-09 DIAGNOSIS — R92.8 ABNORMAL MAMMOGRAM: ICD-10-CM

## 2025-07-09 DIAGNOSIS — F17.200 TOBACCO DEPENDENCY: ICD-10-CM

## 2025-07-09 DIAGNOSIS — N76.4 VULVAR ABSCESS: ICD-10-CM

## 2025-07-09 DIAGNOSIS — E10.9 TYPE 1 DIABETES MELLITUS WITHOUT COMPLICATION: ICD-10-CM

## 2025-07-09 DIAGNOSIS — Z00.00 HEALTHCARE MAINTENANCE: Primary | ICD-10-CM

## 2025-07-09 DIAGNOSIS — M79.7 FIBROMYALGIA: ICD-10-CM

## 2025-07-09 LAB — POC HEMOGLOBIN A1C: 6.7 % (ref 4.2–6.5)

## 2025-07-09 PROCEDURE — 99214 OFFICE O/P EST MOD 30 MIN: CPT

## 2025-07-09 PROCEDURE — 3079F DIAST BP 80-89 MM HG: CPT

## 2025-07-09 PROCEDURE — 3008F BODY MASS INDEX DOCD: CPT

## 2025-07-09 PROCEDURE — 3074F SYST BP LT 130 MM HG: CPT

## 2025-07-09 PROCEDURE — 4010F ACE/ARB THERAPY RXD/TAKEN: CPT

## 2025-07-09 PROCEDURE — 83036 HEMOGLOBIN GLYCOSYLATED A1C: CPT

## 2025-07-09 PROCEDURE — 3044F HG A1C LEVEL LT 7.0%: CPT

## 2025-07-09 RX ORDER — LURASIDONE HYDROCHLORIDE 80 MG/1
80 TABLET, FILM COATED ORAL NIGHTLY
COMMUNITY
Start: 2025-07-03

## 2025-07-09 RX ORDER — BLOOD-GLUCOSE TRANSMITTER
EACH MISCELLANEOUS
COMMUNITY
Start: 2025-03-26

## 2025-07-09 RX ORDER — PEN NEEDLE, DIABETIC 31 GX5/16"
NEEDLE, DISPOSABLE MISCELLANEOUS
Qty: 200 EACH | Refills: 0 | Status: SHIPPED | OUTPATIENT
Start: 2025-07-09

## 2025-07-09 ASSESSMENT — ENCOUNTER SYMPTOMS
WOUND: 1
CHILLS: 0
FATIGUE: 1
NAUSEA: 0
SHORTNESS OF BREATH: 0
CONSTIPATION: 0
BACK PAIN: 1
HEADACHES: 0
APPETITE CHANGE: 0
DYSURIA: 0
WEAKNESS: 0
FEVER: 0
LIGHT-HEADEDNESS: 0
DIFFICULTY URINATING: 0
DIARRHEA: 0
NUMBNESS: 0
JOINT SWELLING: 0
FREQUENCY: 0
ABDOMINAL PAIN: 0
ACTIVITY CHANGE: 0
COUGH: 0
DIZZINESS: 0
VOMITING: 0

## 2025-07-09 ASSESSMENT — PAIN SCALES - GENERAL: PAINLEVEL_OUTOF10: 0-NO PAIN

## 2025-07-09 NOTE — ASSESSMENT & PLAN NOTE
- Pt has chronic pain and difficulty with ambulation at times  - Declines referral to outpatient PT citing transportation issues  - Has seen pain management in the past, recommended ketamine infusions but pt was not interested

## 2025-07-09 NOTE — ASSESSMENT & PLAN NOTE
- Currently smoking ~ 1 ppd but interested in quitting again  - Declines nicotine patches/medication assistance at this time, pt aware we can revisit if she is having difficulty with quitting   - Follow up in 1 month

## 2025-07-19 ASSESSMENT — ENCOUNTER SYMPTOMS
NAUSEA: 0
FEVER: 0
CONSTIPATION: 0
DYSURIA: 0
HEMATURIA: 0
DIARRHEA: 1
VOMITING: 0
SORE THROAT: 0
FLANK PAIN: 0
HEADACHES: 0
ANOREXIA: 0
ABDOMINAL PAIN: 0
FREQUENCY: 1
BACK PAIN: 0
CHILLS: 0

## 2025-07-22 ENCOUNTER — APPOINTMENT (OUTPATIENT)
Dept: OBSTETRICS AND GYNECOLOGY | Facility: CLINIC | Age: 55
End: 2025-07-22
Payer: MEDICARE

## 2025-07-22 VITALS
WEIGHT: 226 LBS | DIASTOLIC BLOOD PRESSURE: 78 MMHG | BODY MASS INDEX: 42.67 KG/M2 | SYSTOLIC BLOOD PRESSURE: 120 MMHG | HEIGHT: 61 IN

## 2025-07-22 DIAGNOSIS — N76.4 VULVAR ABSCESS: ICD-10-CM

## 2025-07-22 PROCEDURE — 3074F SYST BP LT 130 MM HG: CPT | Performed by: NURSE PRACTITIONER

## 2025-07-22 PROCEDURE — 4010F ACE/ARB THERAPY RXD/TAKEN: CPT | Performed by: NURSE PRACTITIONER

## 2025-07-22 PROCEDURE — 3008F BODY MASS INDEX DOCD: CPT | Performed by: NURSE PRACTITIONER

## 2025-07-22 PROCEDURE — 3044F HG A1C LEVEL LT 7.0%: CPT | Performed by: NURSE PRACTITIONER

## 2025-07-22 PROCEDURE — 3078F DIAST BP <80 MM HG: CPT | Performed by: NURSE PRACTITIONER

## 2025-07-22 PROCEDURE — 99213 OFFICE O/P EST LOW 20 MIN: CPT | Performed by: NURSE PRACTITIONER

## 2025-07-22 RX ORDER — SULFAMETHOXAZOLE AND TRIMETHOPRIM 800; 160 MG/1; MG/1
1 TABLET ORAL 2 TIMES DAILY
Qty: 14 TABLET | Refills: 0 | Status: SHIPPED | OUTPATIENT
Start: 2025-07-22 | End: 2025-07-29

## 2025-07-22 RX ORDER — AMOXICILLIN AND CLAVULANATE POTASSIUM 875; 125 MG/1; MG/1
875 TABLET, FILM COATED ORAL 2 TIMES DAILY
Qty: 14 TABLET | Refills: 0 | Status: SHIPPED | OUTPATIENT
Start: 2025-07-22 | End: 2025-07-29

## 2025-07-22 ASSESSMENT — ENCOUNTER SYMPTOMS
ABDOMINAL PAIN: 0
BACK PAIN: 0
FEVER: 0
CONSTIPATION: 0
FREQUENCY: 1
FLANK PAIN: 0
DIARRHEA: 1
HEMATURIA: 0
HEADACHES: 0
NAUSEA: 0
DYSURIA: 0
CHILLS: 0
VOMITING: 0
SORE THROAT: 0

## 2025-07-22 NOTE — PROGRESS NOTES
"Chief Complaint    VULVAR ABSCESS        HPI    - ABSCESS IS DRAINING AND HURTS  Last edited by Kingston Diaz MA on 2025 10:13 AM.         54 y.o.  female presents for vulvar abscess follow up.    Seen in office on , hx as follows:  Symptoms started 2 days ago.  Located right labia.  Small in size. Draining some.   Not tender or painful but some itching near area.  H/O vulvar boils, abscesses in the past. H/O abscess of buttocks in 2024. She was admitted to the hospital and received IV antibiotics. Had surgical I&D and followed up with wound care outpatient.    Denies fever, chills.   Issues with urinary incontinence, has to wear adult depends. Has seen UroGYN in the past. Had bladder stimulator placed in 2022. She last saw UroGYN in 2022.     She was started on course of Bactrim. Completed as prescribed.   No worsening of area however also has not resolved.   Still small in size and draining with some itching.      She is a smoker. Counseled cessation.   PMH: Diabetes, hyperlipidemia, hypertension, OCD, schizophrenia, PTSD, depression, obesity BMI 40  Family hx is unknown, patient adopted.     /78   Ht (!) 1.549 m (5' 1\")   Wt 103 kg (226 lb)   BMI 42.70 kg/m²      Current Outpatient Medications   Medication Instructions    ALPRAZolam (XANAX) 1 mg, Nightly PRN    atorvastatin (LIPITOR) 20 mg, oral, Daily    BD Ultra-Fine Short Pen Needle 31 gauge x 5/16\" needle USE 1 WITH INSULIN PEN  TWICE DAILY    clotrimazole (Lotrimin) 1 % cream APPLY 1 APPLICATION  TOPICALLY TO AFFECTED AREA TWICE DAILY FOR 28 DAYS    Dexcom G6 Transmitter device     Dexcom G7  misc Use as instructed    FLUoxetine (PROZAC) 40 mg, oral, Daily    insulin glargine (Lantus Solostar U-100 Insulin) 100 unit/mL (3 mL) pen Inject 100 unites daily    insulin lispro (HumaLOG) 100 unit/mL pen To inject mild sliding scale three times a daily  150 or less take no insulin  151 to 200, to take 2 units  201 to 250, " to take 4 units  251 to 300 to take 6  units  301 to 350 to take 8 units  351 to 400 to take 10 units    losartan (COZAAR) 50 mg, oral, Daily    lurasidone (LATUDA) 80 mg, Nightly    medical cannabis 1 each    nystatin (Mycostatin) 100,000 unit/gram powder Apply topically to affected area three times a day as needed    omeprazole (PriLOSEC) 20 mg DR capsule TAKE 2 CAPSULES BY MOUTH ONCE DAILY. DO NOT CRUSH OR CHEW.    ondansetron (ZOFRAN) 4 mg, oral, Every 6 hours during day        Review of Systems   Constitutional:  Negative for chills and fever.   HENT:  Negative for sore throat.    Gastrointestinal:  Positive for diarrhea. Negative for abdominal pain, constipation, nausea and vomiting.   Genitourinary:  Positive for frequency and genital sores. Negative for dysuria, flank pain, hematuria, pelvic pain, urgency and vaginal discharge.   Musculoskeletal:  Negative for back pain.   Skin:  Negative for rash.   Neurological:  Negative for headaches.   All other systems reviewed and are negative.       Physical Exam  Constitutional:       Appearance: Normal appearance.   HENT:      Head: Normocephalic.      Nose: Nose normal.   Pulmonary:      Effort: Pulmonary effort is normal.   Genitourinary:       Comments: Small right labial abscess ~3 cm in size, draining   Left labial sebaceous cyst      Musculoskeletal:         General: Normal range of motion.      Cervical back: Normal range of motion and neck supple.     Neurological:      Mental Status: She is alert.     Psychiatric:         Mood and Affect: Mood normal.         Behavior: Behavior normal.          Assessment/Plan:  1. Vulvar abscess  -Culture collected:  - [facility] CULTURE, AEROBIC AND ANAEROBIC W/GRAM STAIN  -Area small. Option for I&D given today, patient declines.  -Rx's sent:  - sulfamethoxazole-trimethoprim (Bactrim DS) 800-160 mg tablet; Take 1 tablet by mouth 2 times a day for 7 days.  Dispense: 14 tablet; Refill: 0  - amoxicillin-clavulanate (Augmentin)  875-125 mg tablet; Take 1 tablet (875 mg of amoxicillin) by mouth 2 times a day for 7 days.  Dispense: 14 tablet; Refill: 0   -Follow up if worsens or no improvement.   -Go to ED for any emergencies as needed.

## 2025-07-26 LAB
BACTERIA SPEC AEROBE CULT: NORMAL
BACTERIA SPEC ANAEROBE CULT: NORMAL

## 2025-07-29 LAB
BACTERIA SPEC AEROBE CULT: NORMAL
BACTERIA SPEC ANAEROBE CULT: NORMAL

## 2025-08-05 DIAGNOSIS — E10.9 TYPE 1 DIABETES MELLITUS WITHOUT COMPLICATION: ICD-10-CM

## 2025-08-05 RX ORDER — INSULIN GLARGINE 100 [IU]/ML
INJECTION, SOLUTION SUBCUTANEOUS
Qty: 15 ML | Refills: 1 | Status: SHIPPED | OUTPATIENT
Start: 2025-08-05

## 2025-08-06 DIAGNOSIS — E78.00 HYPERCHOLESTEROLEMIA: ICD-10-CM

## 2025-08-06 DIAGNOSIS — I10 BENIGN HYPERTENSION: ICD-10-CM

## 2025-08-06 RX ORDER — ATORVASTATIN CALCIUM 20 MG/1
20 TABLET, FILM COATED ORAL DAILY
Qty: 90 TABLET | Refills: 1 | Status: SHIPPED | OUTPATIENT
Start: 2025-08-06

## 2025-08-06 RX ORDER — LOSARTAN POTASSIUM 50 MG/1
50 TABLET ORAL DAILY
Qty: 90 TABLET | Refills: 1 | Status: SHIPPED | OUTPATIENT
Start: 2025-08-06

## 2025-08-11 ENCOUNTER — TELEPHONE (OUTPATIENT)
Dept: OBSTETRICS AND GYNECOLOGY | Facility: CLINIC | Age: 55
End: 2025-08-11
Payer: MEDICARE

## 2025-08-11 DIAGNOSIS — N76.4 VULVAR ABSCESS: Primary | ICD-10-CM

## 2025-08-11 RX ORDER — SULFAMETHOXAZOLE AND TRIMETHOPRIM 800; 160 MG/1; MG/1
1 TABLET ORAL 2 TIMES DAILY
Qty: 14 TABLET | Refills: 0 | Status: SHIPPED | OUTPATIENT
Start: 2025-08-11 | End: 2025-08-18

## 2025-08-12 PROBLEM — Z12.11 COLON CANCER SCREENING: Status: ACTIVE | Noted: 2025-08-12

## 2025-08-13 ENCOUNTER — TELEPHONE (OUTPATIENT)
Dept: HOME HEALTH SERVICES | Facility: HOME HEALTH | Age: 55
End: 2025-08-13

## 2025-08-13 ENCOUNTER — APPOINTMENT (OUTPATIENT)
Dept: PRIMARY CARE | Facility: CLINIC | Age: 55
End: 2025-08-13
Payer: MEDICARE

## 2025-08-13 VITALS
BODY MASS INDEX: 43.05 KG/M2 | HEART RATE: 76 BPM | HEIGHT: 61 IN | DIASTOLIC BLOOD PRESSURE: 81 MMHG | SYSTOLIC BLOOD PRESSURE: 120 MMHG | WEIGHT: 228 LBS | RESPIRATION RATE: 18 BRPM | OXYGEN SATURATION: 95 %

## 2025-08-13 DIAGNOSIS — R26.2 DIFFICULTY WALKING: ICD-10-CM

## 2025-08-13 DIAGNOSIS — E10.9 TYPE 1 DIABETES MELLITUS WITHOUT COMPLICATION: ICD-10-CM

## 2025-08-13 DIAGNOSIS — R92.8 ABNORMAL MAMMOGRAM: ICD-10-CM

## 2025-08-13 DIAGNOSIS — Z00.00 HEALTHCARE MAINTENANCE: Primary | ICD-10-CM

## 2025-08-13 DIAGNOSIS — F17.200 TOBACCO DEPENDENCY: ICD-10-CM

## 2025-08-13 DIAGNOSIS — N76.4 VULVAR ABSCESS: ICD-10-CM

## 2025-08-13 DIAGNOSIS — Z12.11 COLON CANCER SCREENING: ICD-10-CM

## 2025-08-13 DIAGNOSIS — I10 BENIGN HYPERTENSION: ICD-10-CM

## 2025-08-13 PROCEDURE — 3044F HG A1C LEVEL LT 7.0%: CPT

## 2025-08-13 PROCEDURE — 3008F BODY MASS INDEX DOCD: CPT

## 2025-08-13 PROCEDURE — 99214 OFFICE O/P EST MOD 30 MIN: CPT

## 2025-08-13 PROCEDURE — G0439 PPPS, SUBSEQ VISIT: HCPCS

## 2025-08-13 PROCEDURE — 3079F DIAST BP 80-89 MM HG: CPT

## 2025-08-13 PROCEDURE — 4010F ACE/ARB THERAPY RXD/TAKEN: CPT

## 2025-08-13 PROCEDURE — 3074F SYST BP LT 130 MM HG: CPT

## 2025-08-13 RX ORDER — BLOOD-GLUCOSE SENSOR
EACH MISCELLANEOUS
COMMUNITY
Start: 2025-08-03

## 2025-08-13 ASSESSMENT — PAIN SCALES - GENERAL: PAINLEVEL_OUTOF10: 0-NO PAIN

## 2025-08-13 ASSESSMENT — ENCOUNTER SYMPTOMS
CONSTIPATION: 0
DIARRHEA: 0
VOMITING: 0
ACTIVITY CHANGE: 0
NAUSEA: 0
LIGHT-HEADEDNESS: 0
DIFFICULTY URINATING: 0
APPETITE CHANGE: 0
FREQUENCY: 0
DYSURIA: 0
JOINT SWELLING: 0
COUGH: 1
CHILLS: 0
FEVER: 0
DIZZINESS: 0
WHEEZING: 1
ABDOMINAL PAIN: 0
SHORTNESS OF BREATH: 0

## 2025-08-13 ASSESSMENT — ACTIVITIES OF DAILY LIVING (ADL)
DRESSING: INDEPENDENT
DOING_HOUSEWORK: INDEPENDENT
BATHING: INDEPENDENT
GROCERY_SHOPPING: INDEPENDENT
TAKING_MEDICATION: INDEPENDENT
MANAGING_FINANCES: INDEPENDENT

## 2025-08-14 LAB
ALBUMIN/CREAT UR: 53 MG/G CREAT
CREAT UR-MCNC: 101 MG/DL (ref 20–275)
MICROALBUMIN UR-MCNC: 5.4 MG/DL

## 2025-08-25 DIAGNOSIS — G89.29 CHRONIC PAIN OF BOTH KNEES: Primary | ICD-10-CM

## 2025-08-25 DIAGNOSIS — M25.562 CHRONIC PAIN OF BOTH KNEES: Primary | ICD-10-CM

## 2025-08-25 DIAGNOSIS — M25.561 CHRONIC PAIN OF BOTH KNEES: Primary | ICD-10-CM
